# Patient Record
Sex: MALE | Race: BLACK OR AFRICAN AMERICAN | Employment: OTHER | URBAN - METROPOLITAN AREA
[De-identification: names, ages, dates, MRNs, and addresses within clinical notes are randomized per-mention and may not be internally consistent; named-entity substitution may affect disease eponyms.]

---

## 2018-01-11 ENCOUNTER — OFFICE VISIT (OUTPATIENT)
Dept: ORTHOPEDIC SURGERY | Facility: CLINIC | Age: 63
End: 2018-01-11

## 2018-01-11 VITALS
TEMPERATURE: 98.5 F | WEIGHT: 174.6 LBS | HEART RATE: 73 BPM | OXYGEN SATURATION: 96 % | HEIGHT: 68 IN | DIASTOLIC BLOOD PRESSURE: 79 MMHG | BODY MASS INDEX: 26.46 KG/M2 | RESPIRATION RATE: 18 BRPM | SYSTOLIC BLOOD PRESSURE: 141 MMHG

## 2018-01-11 DIAGNOSIS — M54.16 LUMBAR RADICULOPATHY: ICD-10-CM

## 2018-01-11 DIAGNOSIS — G89.29 CHRONIC PAIN OF RIGHT KNEE: ICD-10-CM

## 2018-01-11 DIAGNOSIS — E13.49 OTHER DIABETIC NEUROLOGICAL COMPLICATION ASSOCIATED WITH OTHER SPECIFIED DIABETES MELLITUS (HCC): ICD-10-CM

## 2018-01-11 DIAGNOSIS — S39.012A STRAIN OF LUMBAR REGION, INITIAL ENCOUNTER: Chronic | ICD-10-CM

## 2018-01-11 DIAGNOSIS — M51.36 DDD (DEGENERATIVE DISC DISEASE), LUMBAR: Primary | ICD-10-CM

## 2018-01-11 DIAGNOSIS — S16.1XXA STRAIN OF NECK MUSCLE, INITIAL ENCOUNTER: Chronic | ICD-10-CM

## 2018-01-11 DIAGNOSIS — M25.561 CHRONIC PAIN OF RIGHT KNEE: ICD-10-CM

## 2018-01-11 DIAGNOSIS — M25.571 RIGHT ANKLE PAIN, UNSPECIFIED CHRONICITY: ICD-10-CM

## 2018-01-11 DIAGNOSIS — M17.11 PRIMARY OSTEOARTHRITIS OF RIGHT KNEE: ICD-10-CM

## 2018-01-11 RX ORDER — METOPROLOL TARTRATE 50 MG/1
TABLET ORAL
Refills: 0 | COMMUNITY
Start: 2017-10-21

## 2018-01-11 RX ORDER — CYANOCOBALAMIN 1000 UG/ML
INJECTION, SOLUTION INTRAMUSCULAR; SUBCUTANEOUS
Refills: 0 | COMMUNITY
Start: 2017-12-31

## 2018-01-11 RX ORDER — FAMOTIDINE 40 MG/1
TABLET, FILM COATED ORAL
Refills: 0 | COMMUNITY
Start: 2017-12-11

## 2018-01-11 RX ORDER — BRIMONIDINE TARTRATE 1 MG/ML
SOLUTION/ DROPS OPHTHALMIC
Refills: 0 | COMMUNITY
Start: 2017-12-31

## 2018-01-11 RX ORDER — BUPIVACAINE HYDROCHLORIDE 2.5 MG/ML
4 INJECTION, SOLUTION EPIDURAL; INFILTRATION; INTRACAUDAL ONCE
Qty: 4 ML | Refills: 0
Start: 2018-01-11 | End: 2018-01-11

## 2018-01-11 RX ORDER — BETAMETHASONE SODIUM PHOSPHATE AND BETAMETHASONE ACETATE 3; 3 MG/ML; MG/ML
6 INJECTION, SUSPENSION INTRA-ARTICULAR; INTRALESIONAL; INTRAMUSCULAR; SOFT TISSUE ONCE
Qty: 0.5 ML | Refills: 0
Start: 2018-01-11 | End: 2018-01-11

## 2018-01-11 RX ORDER — ATORVASTATIN CALCIUM 10 MG/1
TABLET, FILM COATED ORAL
Refills: 0 | COMMUNITY
Start: 2017-12-31 | End: 2020-09-23

## 2018-01-11 RX ORDER — LORATADINE 10 MG/1
TABLET ORAL
Refills: 0 | COMMUNITY
Start: 2017-12-31

## 2018-01-11 RX ORDER — LATANOPROST 50 UG/ML
SOLUTION/ DROPS OPHTHALMIC
Refills: 0 | COMMUNITY
Start: 2017-12-31

## 2018-01-11 RX ORDER — AMLODIPINE BESYLATE 5 MG/1
TABLET ORAL
Refills: 0 | COMMUNITY
Start: 2017-10-21

## 2018-01-11 RX ORDER — DORZOLAMIDE HYDROCHLORIDE AND TIMOLOL MALEATE 20; 5 MG/ML; MG/ML
SOLUTION/ DROPS OPHTHALMIC
Refills: 0 | COMMUNITY
Start: 2017-12-06

## 2018-01-11 RX ORDER — FLUTICASONE PROPIONATE 44 UG/1
AEROSOL, METERED RESPIRATORY (INHALATION)
Refills: 0 | COMMUNITY
Start: 2017-12-11

## 2018-01-11 RX ORDER — GABAPENTIN 100 MG/1
CAPSULE ORAL
Refills: 0 | COMMUNITY
Start: 2017-12-31

## 2018-01-11 RX ORDER — MIRTAZAPINE 15 MG/1
TABLET, FILM COATED ORAL
Refills: 0 | COMMUNITY
Start: 2017-10-21

## 2018-01-11 RX ORDER — GUAIFENESIN 100 MG/5ML
81 LIQUID (ML) ORAL DAILY
COMMUNITY

## 2018-01-11 RX ORDER — LOVASTATIN 40 MG/1
TABLET ORAL
Refills: 0 | COMMUNITY
Start: 2017-10-21 | End: 2020-09-23

## 2018-01-11 NOTE — PROGRESS NOTES
Chief Complaint   Patient presents with    Ankle Pain     Right    Knee Pain     Right    Hip Pain     Right

## 2018-01-11 NOTE — PATIENT INSTRUCTIONS
Learning About How to Have a Healthy Back  What causes back pain? Back pain is often caused by overuse, strain, or injury. For example, people often hurt their backs playing sports or working in the yard, being jolted in a car accident, or lifting something too heavy. Aging plays a part too. Your bones and muscles tend to lose strength as you age, which makes injury more likely. The spongy discs between the bones of the spine (vertebrae) may suffer from wear and tear and no longer provide enough cushion between the bones. A disc that bulges or breaks open (herniated disc) can press on nerves, causing back pain. In some people, back pain is the result of arthritis, broken vertebrae caused by bone loss (osteoporosis), illness, or a spine problem. Although most people have back pain at one time or another, there are steps you can take to make it less likely. How can you have a healthy back? Reduce stress on your back through good posture  Slumping or slouching alone may not cause low back pain. But after the back has been strained or injured, bad posture can make pain worse. · Sleep in a position that maintains your back's normal curves and on a mattress that feels comfortable. Sleep on your side with a pillow between your knees, or sleep on your back with a pillow under your knees. These positions can reduce strain on your back. · Stand and sit up straight. \"Good posture\" generally means your ears, shoulders, and hips are in a straight line. · If you must stand for a long time, put one foot on a stool, ledge, or box. Switch feet every now and then. · Sit in a chair that is low enough to let you place both feet flat on the floor with both knees nearly level with your hips. If your chair or desk is too high, use a footrest to raise your knees. Place a small pillow, a rolled-up towel, or a lumbar roll in the curve of your back if you need extra support.   · Try a kneeling chair, which helps tilt your hips forward. This takes pressure off your lower back. · Try sitting on an exercise ball. It can rock from side to side, which helps keep your back loose. · When driving, keep your knees nearly level with your hips. Sit straight, and drive with both hands on the steering wheel. Your arms should be in a slightly bent position. Reduce stress on your back through careful lifting  · Squat down, bending at the hips and knees only. If you need to, put one knee to the floor and extend your other knee in front of you, bent at a right angle (half kneeling). · Press your chest straight forward. This helps keep your upper back straight while keeping a slight arch in your low back. · Hold the load as close to your body as possible, at the level of your belly button (navel). · Use your feet to change direction, taking small steps. · Lead with your hips as you change direction. Keep your shoulders in line with your hips as you move. · Set down your load carefully, squatting with your knees and hips only. Exercise and stretch your back  · Do some exercise on most days of the week, if your doctor says it is okay. You can walk, run, swim, or cycle. · Stretch your back muscles. Here are a few exercises to try:  Nakul Foil on your back, and gently pull one bent knee to your chest. Put that foot back on the floor, and then pull the other knee to your chest.  ¨ Do pelvic tilts. Lie on your back with your knees bent. Tighten your stomach muscles. Pull your belly button (navel) in and up toward your ribs. You should feel like your back is pressing to the floor and your hips and pelvis are slightly lifting off the floor. Hold for 6 seconds while breathing smoothly. ¨ Sit with your back flat against a wall. · Keep your core muscles strong. The muscles of your back, belly (abdomen), and buttocks support your spine. ¨ Pull in your belly and imagine pulling your navel toward your spine. Hold this for 6 seconds, then relax.  Remember to keep breathing normally as you tense your muscles. ¨ Do curl-ups. Always do them with your knees bent. Keep your low back on the floor, and curl your shoulders toward your knees using a smooth, slow motion. Keep your arms folded across your chest. If this bothers your neck, try putting your hands behind your neck (not your head), with your elbows spread apart. ¨ Lie on your back with your knees bent and your feet flat on the floor. Tighten your belly muscles, and then push with your feet and raise your buttocks up a few inches. Hold this position 6 seconds as you continue to breathe normally, then lower yourself slowly to the floor. Repeat 8 to 12 times. ¨ If you like group exercise, try Pilates or yoga. These classes have poses that strengthen the core muscles. Lead a healthy lifestyle  · Stay at a healthy weight to avoid strain on your back. · Do not smoke. Smoking increases the risk of osteoporosis, which weakens the spine. If you need help quitting, talk to your doctor about stop-smoking programs and medicines. These can increase your chances of quitting for good. Where can you learn more? Go to http://radhaSellanAppla.info/. Enter L315 in the search box to learn more about \"Learning About How to Have a Healthy Back. \"  Current as of: March 21, 2017  Content Version: 11.4  © 7927-5328 Healthwise, Incorporated. Care instructions adapted under license by [employer] (which disclaims liability or warranty for this information). If you have questions about a medical condition or this instruction, always ask your healthcare professional. [place] [address] any warranty or liability for your use of this information. Neck: Exercises  Your Care Instructions  Here are some examples of typical rehabilitation exercises for your condition. Start each exercise slowly. Ease off the exercise if you start to have pain.   Your doctor or physical therapist will tell you when you can start these exercises and which ones will work best for you. How to do the exercises  Neck stretch    1. This stretch works best if you keep your shoulder down as you lean away from it. To help you remember to do this, start by relaxing your shoulders and lightly holding on to your thighs or your chair. 2. Tilt your head toward your shoulder and hold for 15 to 30 seconds. Let the weight of your head stretch your muscles. 3. If you would like a little added stretch, use your hand to gently and steadily pull your head toward your shoulder. For example, keeping your right shoulder down, lean your head to the left. 4. Repeat 2 to 4 times toward each shoulder. Diagonal neck stretch    1. Turn your head slightly toward the direction you will be stretching, and tilt your head diagonally toward your chest and hold for 15 to 30 seconds. 2. If you would like a little added stretch, use your hand to gently and steadily pull your head forward on the diagonal.  3. Repeat 2 to 4 times toward each side. Dorsal glide stretch    The dorsal glide stretches the back of the neck. If you feel pain, do not glide so far back. Some people find this exercise easier to do while lying on their backs with an ice pack on the neck. 1. Sit or stand tall and look straight ahead. 2. Slowly tuck your chin as you glide your head backward over your body  3. Hold for a count of 6, and then relax for up to 10 seconds. 4. Repeat 8 to 12 times. Chest and shoulder stretch    1. Sit or stand tall and glide your head backward as in the dorsal glide stretch. 2. Raise both arms so that your hands are next to your ears. 3. Take a deep breath, and as you breathe out, lower your elbows down and behind your back. You will feel your shoulder blades slide down and together, and at the same time you will feel a stretch across your chest and the front of your shoulders. 4. Hold for about 6 seconds, and then relax for up to 10 seconds.   5. Repeat 8 to 12 times. Strengthening: Hands on head    1. Move your head backward, forward, and side to side against gentle pressure from your hands, holding each position for about 6 seconds. 2. Repeat 8 to 12 times. Follow-up care is a key part of your treatment and safety. Be sure to make and go to all appointments, and call your doctor if you are having problems. It's also a good idea to know your test results and keep a list of the medicines you take. Where can you learn more? Go to http://radha-la.info/. Enter P975 in the search box to learn more about \"Neck: Exercises. \"  Current as of: March 21, 2017  Content Version: 11.4  © 7958-6626 Healthwise, Incorporated. Care instructions adapted under license by [employer] (which disclaims liability or warranty for this information). If you have questions about a medical condition or this instruction, always ask your healthcare professional. [address] any warranty or liability for your use of this information.
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
## 2018-01-11 NOTE — MR AVS SNAPSHOT
Visit Information Date & Time Provider Department Dept. Phone Encounter #  
 1/11/2018 10:00 AM Hien Richards, 27 [facility] [place] [address] [phone] [id number] Follow-up Instructions Return if symptoms worsen or fail to improve. Upcoming Health Maintenance Date Due Hepatitis C Screening 1955 DTaP/Tdap/Td series (1 - Tdap) 3/31/1977 FOBT Q 1 YEAR AGE 50-75 3/31/2006 ZOSTER VACCINE AGE 60> 1/31/2016 Influenza Age 5 to Adult 8/1/2017 Allergies as of 1/11/2018  Review Complete On: 1/11/2018 By: Monroe Merlin No Known Allergies Current Immunizations  Never Reviewed No immunizations on file. Not reviewed this visit You Were Diagnosed With   
  
 Codes Comments DDD (degenerative disc disease), lumbar    -  Primary ICD-10-CM: M51.36 
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 
ICD-9-CM: 249.60 Chronic pain of right knee     ICD-10-CM: M25.561, G89.29 ICD-9-CM: 719.46, 338.29 Lumbar radiculopathy     ICD-10-CM: M54.16 
ICD-9-CM: 724.4 Strain of neck muscle, initial encounter     ICD-10-CM: S16. Sara Reil ICD-9-CM: 847.0 Strain of lumbar region, initial encounter     ICD-10-CM: S39.012A ICD-9-CM: 348. 2 Vitals BP Pulse Temp Resp Height(growth percentile) Weight(growth percentile) 141/79 73 98.5 °F (36.9 °C) (Oral) 18 5' 8\" (1.727 m) 174 lb 9.6 oz (79.2 kg) SpO2 BMI Smoking Status 96% 26.55 kg/m2 Current Some Day Smoker BMI and BSA Data Body Mass Index Body Surface Area  
 26.55 kg/m 2 1.95 m 2 Your Updated Medication List  
  
   
 This list is accurate as of: 1/11/18 10:52 AM.  Always use your most recent med list.  
  
  
  
  
 ALPHAGAN P 0.1 % ophthalmic solution Generic drug:  brimonidine  
  
 amLODIPine 5 mg tablet Commonly known as:  NORVASC  
  
 aspirin 81 mg chewable tablet Take 81 mg by mouth daily. atorvastatin 10 mg tablet Commonly known as:  LIPITOR  
  
 cyanocobalamin 1,000 mcg/mL injection Commonly known as:  VITAMIN B12  
  
 dorzolamide-timolol 22.3-6.8 mg/mL ophthalmic solution Commonly known as:  COSOPT  
instill 1 drop into both eyes once daily  
  
 famotidine 40 mg tablet Commonly known as:  PEPCID  
take 1 tablet by mouth at bedtime FLOVENT HFA 44 mcg/actuation inhaler Generic drug:  fluticasone  
inhale 2 puffs by mouth twice a day  
  
 gabapentin 100 mg capsule Commonly known as:  NEURONTIN  
  
 latanoprost 0.005 % ophthalmic solution Commonly known as:  XALATAN  
  
 loratadine 10 mg tablet Commonly known as:  CLARITIN  
  
 lovastatin 40 mg tablet Commonly known as:  MEVACOR  
  
 metoprolol tartrate 50 mg tablet Commonly known as:  LOPRESSOR  
  
 mirtazapine 15 mg tablet Commonly known as:  [place] We Performed the Following AMB POC XRAY, ANKLE; COMPLETE, 3+ VIE [13264 CPT(R)] Follow-up Instructions Return if symptoms worsen or fail to improve. Patient Instructions Learning About How to Have a Healthy Back What causes back pain? Back pain is often caused by overuse, strain, or injury. For example, people often hurt their backs playing sports or working in the yard, being jolted in a car accident, or lifting something too heavy. Aging plays a part too. Your bones and muscles tend to lose strength as you age, which makes injury more likely. The spongy discs between the bones of the spine (vertebrae) may suffer from wear and tear and no longer provide enough cushion between the bones.  A disc that bulges or breaks open (herniated disc) can press on nerves, causing back pain. In some people, back pain is the result of arthritis, broken vertebrae caused by bone loss (osteoporosis), illness, or a spine problem. Although most people have back pain at one time or another, there are steps you can take to make it less likely. How can you have a healthy back? Reduce stress on your back through good posture Slumping or slouching alone may not cause low back pain. But after the back has been strained or injured, bad posture can make pain worse. · Sleep in a position that maintains your back's normal curves and on a mattress that feels comfortable. Sleep on your side with a pillow between your knees, or sleep on your back with a pillow under your knees. These positions can reduce strain on your back. · Stand and sit up straight. \"Good posture\" generally means your ears, shoulders, and hips are in a straight line. · If you must stand for a long time, put one foot on a stool, ledge, or box. Switch feet every now and then. · Sit in a chair that is low enough to let you place both feet flat on the floor with both knees nearly level with your hips. If your chair or desk is too high, use a footrest to raise your knees. Place a small pillow, a rolled-up towel, or a lumbar roll in the curve of your back if you need extra support. · Try a kneeling chair, which helps tilt your hips forward. This takes pressure off your lower back. · Try sitting on an exercise ball. It can rock from side to side, which helps keep your back loose. · When driving, keep your knees nearly level with your hips. Sit straight, and drive with both hands on the steering wheel. Your arms should be in a slightly bent position. Reduce stress on your back through careful lifting · Squat down, bending at the hips and knees only. If you need to, put one knee to the floor and extend your other knee in front of you, bent at a right angle (half kneeling). · Press your chest straight forward. This helps keep your upper back straight while keeping a slight arch in your low back. · Hold the load as close to your body as possible, at the level of your belly button (navel). · Use your feet to change direction, taking small steps. · Lead with your hips as you change direction. Keep your shoulders in line with your hips as you move. · Set down your load carefully, squatting with your knees and hips only. Exercise and stretch your back · Do some exercise on most days of the week, if your doctor says it is okay. You can walk, run, swim, or cycle. · Stretch your back muscles. Here are a few exercises to try: ¨ Lie on your back, and gently pull one bent knee to your chest. Put that foot back on the floor, and then pull the other knee to your chest. 
¨ Do pelvic tilts. Lie on your back with your knees bent. Tighten your stomach muscles. Pull your belly button (navel) in and up toward your ribs. You should feel like your back is pressing to the floor and your hips and pelvis are slightly lifting off the floor. Hold for 6 seconds while breathing smoothly. ¨ Sit with your back flat against a wall. · Keep your core muscles strong. The muscles of your back, belly (abdomen), and buttocks support your spine. ¨ Pull in your belly and imagine pulling your navel toward your spine. Hold this for 6 seconds, then relax. Remember to keep breathing normally as you tense your muscles. ¨ Do curl-ups. Always do them with your knees bent. Keep your low back on the floor, and curl your shoulders toward your knees using a smooth, slow motion. Keep your arms folded across your chest. If this bothers your neck, try putting your hands behind your neck (not your head), with your elbows spread apart. ¨ Lie on your back with your knees bent and your feet flat on the floor.  Tighten your belly muscles, and then push with your feet and raise your buttocks up a few inches. Hold this position 6 seconds as you continue to breathe normally, then lower yourself slowly to the floor. Repeat 8 to 12 times. ¨ If you like group exercise, try Pilates or yoga. These classes have poses that strengthen the core muscles. Lead a healthy lifestyle · Stay at a healthy weight to avoid strain on your back. · Do not smoke. Smoking increases the risk of osteoporosis, which weakens the spine. If you need help quitting, talk to your doctor about stop-smoking programs and medicines. These can increase your chances of quitting for good. Where can you learn more? Go to http://radhaDatria Systemsla.info/. Enter L315 in the search box to learn more about \"Learning About How to Have a Healthy Back. \" Current as of: March 21, 2017 Content Version: 11.4 © 7088-9101 [employer]. Care instructions adapted under license by [employer] (which disclaims liability or warranty for this information). If you have questions about a medical condition or this instruction, always ask your healthcare professional. [place] [address] any warranty or liability for your use of this information. Neck: Exercises Your Care Instructions Here are some examples of typical rehabilitation exercises for your condition. Start each exercise slowly. Ease off the exercise if you start to have pain. Your doctor or physical therapist will tell you when you can start these exercises and which ones will work best for you. How to do the exercises Neck stretch 1. This stretch works best if you keep your shoulder down as you lean away from it. To help you remember to do this, start by relaxing your shoulders and lightly holding on to your thighs or your chair. 2. Tilt your head toward your shoulder and hold for 15 to 30 seconds. Let the weight of your head stretch your muscles.  
3. If you would like a little added stretch, use your hand to gently and steadily pull your head toward your shoulder. For example, keeping your right shoulder down, lean your head to the left. 4. Repeat 2 to 4 times toward each shoulder. Diagonal neck stretch 1. Turn your head slightly toward the direction you will be stretching, and tilt your head diagonally toward your chest and hold for 15 to 30 seconds. 2. If you would like a little added stretch, use your hand to gently and steadily pull your head forward on the diagonal. 
3. Repeat 2 to 4 times toward each side. Dorsal glide stretch The dorsal glide stretches the back of the neck. If you feel pain, do not glide so far back. Some people find this exercise easier to do while lying on their backs with an ice pack on the neck. 1. Sit or stand tall and look straight ahead. 2. Slowly tuck your chin as you glide your head backward over your body 3. Hold for a count of 6, and then relax for up to 10 seconds. 4. Repeat 8 to 12 times. Chest and shoulder stretch 1. Sit or stand tall and glide your head backward as in the dorsal glide stretch. 2. Raise both arms so that your hands are next to your ears. 3. Take a deep breath, and as you breathe out, lower your elbows down and behind your back. You will feel your shoulder blades slide down and together, and at the same time you will feel a stretch across your chest and the front of your shoulders. 4. Hold for about 6 seconds, and then relax for up to 10 seconds. 5. Repeat 8 to 12 times. Strengthening: Hands on head 1. Move your head backward, forward, and side to side against gentle pressure from your hands, holding each position for about 6 seconds. 2. Repeat 8 to 12 times. Follow-up care is a key part of your treatment and safety. Be sure to make and go to all appointments, and call your doctor if you are having problems. It's also a good idea to know your test results and keep a list of the medicines you take. Where can you learn more? Go to http://radha-la.info/. Enter P975 in the search box to learn more about \"Neck: Exercises. \" Current as of: March 21, 2017 Content Version: 11.4 © 8795-8818 [employer]. Care instructions adapted under license by [employer] (which disclaims liability or warranty for this information). If you have questions about a medical condition or this instruction, always ask your healthcare professional. [address] any warranty or liability for your use of this information. Introducing [facility]! [employer] introduces [employer] patient portal. Now you can access parts of your medical record, email your doctor's office, and request medication refills online. 1. In your internet browser, go to [email or web address] 2. Click on the First Time User? Click Here link in the Sign In box. You will see the New Member Sign Up page. 3. Enter your ProteoSense Access Code exactly as it appears below. You will not need to use this code after youve completed the sign-up process. If you do not sign up before the expiration date, you must request a new code. · Prote[employer]ense Access Code: [email or web address] Expires: 3/7/2018 11:37 AM 
 
4. Enter the last four digits of your Social Security Number (xxxx) and Date of Birth (mm/dd/yyyy) as indicated and click Submit. You will be taken to the next sign-up page. 5. Create a ProteoSense ID. This will be your ProteoSense login ID and cannot be changed, so think of one that is secure and easy to remember. 6. Create a [employer]ense password. You can change your password at any time. 7. Enter your Password Reset Question and Answer. This can be used at a later time if you forget your password. 8. Enter your e-mail address. You will receive e-mail notification when new information is available in [address] 9. Click Sign Up. You can now view and download portions of your medical record. 10. Click the Download Summary menu link to download a portable copy of your medical information. If you have questions, please visit the Frequently Asked Questions section of the [employer] website. Remember, [employer] is NOT to be used for urgent needs. For medical emergencies, dial 911. Now available from your iPhone and Android! Please provide this summary of care documentation to your next provider. Your primary care clinician is listed as Severino Muir. If you have any questions after today's visit, please call [phone].

## 2018-01-16 ENCOUNTER — HOSPITAL ENCOUNTER (OUTPATIENT)
Dept: PHYSICAL THERAPY | Age: 63
Discharge: HOME | End: 2018-01-16
Payer: MEDICARE

## 2018-01-16 PROCEDURE — 97162 PT EVAL MOD COMPLEX 30 MIN: CPT

## 2018-01-16 PROCEDURE — G8979 MOBILITY GOAL STATUS: HCPCS

## 2018-01-16 PROCEDURE — G8978 MOBILITY CURRENT STATUS: HCPCS

## 2018-01-16 PROCEDURE — 97110 THERAPEUTIC EXERCISES: CPT

## 2018-01-16 NOTE — PROGRESS NOTES
PT DAILY TREATMENT NOTE    Patient Name: Blayne Quezada  Date:2018  : 1955  [x]  Patient  Verified  Payor: /    In time:1:25 Out time:2:05  Total Treatment Time (min): 40  Total Timed Codes (min): 40  1:1 Treatment Time ( [address] only): 40   Visit #: 1 of 12    Physical Therapy Evaluation - Lumbar Spine     See POC     General Health:  Red Flags Indicated? [] Yes    [] No  List:  Past History/Treatments:     Diagnostic Tests: [] Lab work [] X-rays    [] CT [] MRI     [] Other:  Results:    Functional Status  Prior level of function:  Present functional limitations:    OBJECTIVE  Posture:    Gait:  [] Normal     [] Abnormal:    Active Movements: [] N/A   [] Too acute   [] Other:  ROM % AROM % PROM Comments:pain, area   Forward flexion       Extension       SB right       SB left       Rotation right       Rotation left         Repeated Movement Testing:    Neuro Screen [] WNL  Myotome/Dermatome/Reflexes:  Comments:    Dural Mobility:  SLR Sitting: [] R    [] L    [] +    [] -  @ (degrees):           Supine: [] R    [] L    [] +    [] -  @ (degrees):   Slump Test: [] R    [] L    [] +    [] -  @ (degrees):   Prone Knee Bend: [] R    [] L    [] +    [] -     Palpation  [] Min  [] Mod  [] Severe    Location:  [] Min  [] Mod  [] Severe    Location:  [] Min  [] Mod  [] Severe    Location:      Strength   L(0-5) R (0-5) N/T   Hip Flexion (L1,2)   []   Knee Extension (L3,4)   []   Ankle Dorsiflexion (L4)   []   Great Toe Extension (L5)   []   Ankle Plantarflexion (S1)   []   Knee Flexion (S1,2)   []   Upper Abdominals   []   Lower Abdominals   []   Paraspinals   []   Back Rotators   []   Gluteus Gatito   []   Other   []     Special Tests  Lumbar:  Lumb.  Compression: [] Pos  [] Neg               Lumbar Distraction:   [] Pos  [] Neg    Quadrant:  [] Pos  [] Neg   [] Flex  [] Ext    Sacroilliac:  Gaenslen's: [] R    [] L    [] +    [] -     Compression: [] +    [] -     Gapping:  [] +    [] -     Thigh Thrust: [] R [] L    [] +    [] -     Leg Length: [] +    [] -   Position:           Hip: Curlie Kodak:  [] R    [] L    [] +    [] -     Scour:  [] R    [] L    [] +    [] -     Piriformis: [] R    [] L    [] +    [] -          Deficits: Mei's: [] R    [] L    [] +    [] -     Rondi Heal: [] R    [] L    [] +    [] -     Hamstrings 90/90:    Gastrocsoleus (to neutral): Right: Left:    Other tests/comments:  OBJECTIVE  Modality (rationale):   []  E-Stim: type _ x _ min     []att   []unatt   []w/US   []w/ice   []w/heat  []  Traction: []cerv   []pelvic   _ lbs x _ min     []pro   []sup   []int   []const  []  Ultrasound: []cont   []pulse    _ W/cm2 x _  min   []1MHz   []3MHz  []  Iontophoresis: []take home patch w/ dexamethazone    []40mA   []80mA                               []_ mA min w/: []dexamethazone   []other:_  []  Ice pack _  min     [] Hot pack _  min     [] Paraffin _  min  []  Other:     Therapeutic Exercise: [x] see flow sheet     [] Other:_  Added/Changed Exercises:  [x]  Added:_See HEP  to improve (function): Hip mobility and strength, RTC/scap stabilization  []  Changed:_ to improve (function):  (minutes:10 )    Other Objective/Functional Measures:   Pain Level (0-10 scale) post treatment: 8    ASSESSMENT  [x]  See Plan of Care  Patient is assigned a medium evaluation complexity based upon presence of >3 medical comorbidities, FOTO score, and changing, widespread and extensive symptom characteristics.     PLAN  See Plan of [address], [place] 1/16/2018  1:07 PM
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
## 2018-01-19 ENCOUNTER — HOSPITAL ENCOUNTER (OUTPATIENT)
Dept: PHYSICAL THERAPY | Age: 63
End: 2018-01-19
Payer: MEDICARE

## 2018-01-23 ENCOUNTER — HOSPITAL ENCOUNTER (OUTPATIENT)
Dept: PHYSICAL THERAPY | Age: 63
Discharge: HOME | End: 2018-01-23
Payer: MEDICARE

## 2018-01-23 PROCEDURE — 97110 THERAPEUTIC EXERCISES: CPT

## 2018-01-23 NOTE — PROGRESS NOTES
PT DAILY TREATMENT NOTE 8    Patient Name: Rui Jaramillo  Date:2018  : 1955  [x]  Patient  Verified  Payor: Jairon Billingsley / Plan: [place] / Product Type: Medicare /    In time::  Out time:2:22  Total Treatment Time (min): 51  Total Timed Codes (min): 43  1:1 Treatment Time (min): 43   Visit #: 2 of 12    Treatment Area: Strain of muscle, fascia and tendon of lower back, initial encounter [O97.501T]  Strain of muscle, fascia and tendon at neck level, initial encounter [S16. 1XXA]  Radiculopathy, lumbar region [M54.16]  Pain in right knee [M25.561]    SUBJECTIVE  Pain Level (0-10 scale): 810  Any medication changes, allergies to medications, adverse drug reactions, diagnosis change, or new procedure performed?: [x] No    [] Yes (see summary sheet for update)  Subjective functional status/changes:   [] No changes reported  I feel most of the pain in the right side of my neck and in my right hip and it locks up and spasms in my groin area down to the inside of my knee when I cross my leg over the other one sometimes. OBJECTIVE      51 min Therapeutic Exercise:  [x] See flow sheet :   Rationale: increase ROM and increase strength to improve the patients ability to improve functional abilities           min Patient Education: [x] Review HEP    [] Progressed/Changed HEP based on:   [] positioning   [] body mechanics   [] transfers   [] heat/ice application        Other Objective/Functional Measures:     Pain Level (0-10 scale) post treatment: 0    ASSESSMENT/Changes in Function: Pt tolerated all new therex fairly well upon trial today with chief c/o R cervical and R hip pain/sxs especially with radicular R adductor pain/spasms with crossing his legs. Pt needs most focus on cervical,lumbar and LE flexibility and postural strengthening with addressing multiple regions of pain/symptoms appropriately. Will continue to progress/advance patient within current POC as tolerated with monitoring symptoms. Patient will continue to benefit from skilled PT services to modify and progress therapeutic interventions, address functional mobility deficits, address ROM deficits, address strength deficits, analyze and cue movement patterns, analyze and modify body mechanics/ergonomics and assess and modify postural abnormalities to attain remaining goals.      []  See Plan of Care  []  See progress note/recertification  []  See Discharge Summary         Progress towards goals / Updated goals:      PLAN  [x]  Upgrade activities as tolerated     []  Continue plan of care  []  Update interventions per flow sheet       []  Discharge due to:_  []  Other:_      Kateryna Stokes, PTA 1/23/2018  1:56 PM

## 2018-01-25 ENCOUNTER — HOSPITAL ENCOUNTER (OUTPATIENT)
Dept: PHYSICAL THERAPY | Age: 63
Discharge: HOME | End: 2018-01-25
Payer: MEDICARE

## 2018-01-25 PROCEDURE — 97110 THERAPEUTIC EXERCISES: CPT

## 2018-01-25 NOTE — PROGRESS NOTES
PT DAILY TREATMENT NOTE 8    Patient Name: Du Hernandez  Date:2018  : 1955  [x]  Patient  Verified  Payor: Kyle Serrato / Plan: [place] / Product Type: Medicare /    In time:2:00  Out time:2:51  Total Treatment Time (min): 51  Total Timed Codes (min): 51  1:1 Treatment Time (min): 30   Visit #: 3 of 12    Treatment Area: Strain of muscle, fascia and tendon of lower back, initial encounter [Q37.494A]  Strain of muscle, fascia and tendon at neck level, initial encounter [S16. 1XXA]  Radiculopathy, lumbar region [M54.16]  Pain in right knee [M25.561]    SUBJECTIVE  Pain Level (0-10 scale): 7  Any medication changes, allergies to medications, adverse drug reactions, diagnosis change, or new procedure performed?: [x] No    [] Yes (see summary sheet for update)  Subjective functional status/changes:   [] No changes reported  Pt reports his R arm and leg are aching today, but were no worse after completing therapy 2 days ago. OBJECTIVE    51 min Therapeutic Exercise:  [] See flow sheet :   Rationale: increase ROM and increase strength to improve the patients ability to change and maintain body/trunk positions     min Therapeutic Activity:  []  See flow sheet :   Rationale:      Pain Level (0-10 scale) post treatment: 2    ASSESSMENT/Changes in Function: Pt requires moderate motor planning cues with performance of several exercises, but otherwise is able to do all prescribed activities with consistently mild/mod fatigue on the right side >left. Patient will continue to benefit from skilled PT services to address functional mobility deficits, address strength deficits and analyze and cue movement patterns to attain remaining goals.      []  See Plan of Care  []  See progress note/recertification  []  See Discharge Summary           PLAN  []  Upgrade activities as tolerated     [x]  Continue plan of care  []  Update interventions per flow sheet       []  Discharge due to:_  []  Other:_ Efrain Brady [place] 1/25/2018  11:13 AM

## 2018-01-31 ENCOUNTER — HOSPITAL ENCOUNTER (OUTPATIENT)
Dept: PHYSICAL THERAPY | Age: 63
Discharge: HOME | End: 2018-01-31
Payer: MEDICARE

## 2018-01-31 PROCEDURE — 97110 THERAPEUTIC EXERCISES: CPT

## 2018-01-31 NOTE — PROGRESS NOTES
PT DAILY TREATMENT NOTE 8    Patient Name: Licha Lomax  Date:2018  : 1955  [x]  Patient  Verified  Payor: Drake Human / Plan: [place] / Product Type: Medicare /    In time:1:02  Out time:2:07  Total Treatment Time (min): 65  Total Timed Codes (min): 58  1:1 Treatment Time (min): 30   Visit #: 4 of 12    Treatment Area: Strain of muscle, fascia and tendon of lower back, initial encounter [M68.456Q]  Strain of muscle, fascia and tendon at neck level, initial encounter [S16. 1XXA]  Radiculopathy, lumbar region [M54.16]  Pain in right knee [M25.561]    SUBJECTIVE  Pain Level (0-10 scale): 7/10  Any medication changes, allergies to medications, adverse drug reactions, diagnosis change, or new procedure performed?: [x] No    [] Yes (see summary sheet for update)  Subjective functional status/changes:   [] No changes reported  I feel like the therapy is helping because I feel like I can move my neck and back better due to feeling more cracking and popping. OBJECTIVE    65 min Therapeutic Exercise:  [x] See flow sheet :   Rationale: increase ROM, increase strength, improve balance and increase proprioception to improve the patients ability to improve functional abilities           min Patient Education: [x] Review HEP    [] Progressed/Changed HEP based on:   [] positioning   [] body mechanics   [] transfers   [] heat/ice application        Other Objective/Functional Measures:     Pain Level (0-10 scale) post treatment: 0    ASSESSMENT/Changes in Function: Pt presented with the most functional improvement with increased cervical and lumbar mobility with ADL's over the past 2 visits. Pt was also increased to addition of prolonged static postural stretching to address palpable tension in anterior compartment/pectorals today. Will continue to progress/advance patient within current POC as tolerated with monitoring symptoms.      Patient will continue to benefit from skilled PT services to modify and progress therapeutic interventions, address functional mobility deficits, address ROM deficits, address strength deficits, analyze and cue movement patterns, analyze and modify body mechanics/ergonomics and assess and modify postural abnormalities  to attain remaining goals.      []  See Plan of Care  []  See progress note/recertification  []  See Discharge Summary         Progress towards goals / Updated goals:      PLAN  [x]  Upgrade activities as tolerated     []  Continue plan of care  []  Update interventions per flow sheet       []  Discharge due to:_  []  Other:_      Shanna Boo PTA 1/31/2018  1:02 PM

## 2018-02-02 ENCOUNTER — HOSPITAL ENCOUNTER (OUTPATIENT)
Dept: PHYSICAL THERAPY | Age: 63
Discharge: HOME | End: 2018-02-02
Payer: MEDICARE

## 2018-02-02 PROCEDURE — 97110 THERAPEUTIC EXERCISES: CPT

## 2018-02-02 NOTE — PROGRESS NOTES
PT DAILY TREATMENT NOTE 8-14    Patient Name: Du Hernandez  Date:2018  : 1955  [x]  Patient  Verified  Payor: Kyle Serrato / Plan: [place] MED[place]ARE PART A [place] B / Product Type: Medicare /    In time:1:02  Out time:2:11  Total Treatment Time (min): 69  Total Timed Codes (min): 62  1:1 Treatment Time (min): 30   Visit #: 5 of 12    Treatment Area: Strain of muscle, fascia and tendon of lower back, initial encounter [Z40.108W]  Strain of muscle, fascia and tendon at neck level, initial encounter [S16. 1XXA]  Radiculopathy, lumbar region [M54.16]  Pain in right knee [M25.561]    SUBJECTIVE  Pain Level (0-10 scale): 6-7/10  Any medication changes, allergies to medications, adverse drug reactions, diagnosis change, or new procedure performed?: [x] No    [] Yes (see summary sheet for update)  Subjective functional status/changes:   [] No changes reported  My neck and both of my shoulders are bothering me the most, but my lower back and hips are actually feeling pretty good today. OBJECTIVE    69 min Therapeutic Exercise:  [x] See flow sheet :   Rationale: increase ROM and increase strength to improve the patients ability to improve abilities           min Patient Education: [x] Review HEP    [] Progressed/Changed HEP based on:   [] positioning   [] body mechanics   [] transfers   [] heat/ice application        Other Objective/Functional Measures:     Pain Level (0-10 scale) post treatment: 0    ASSESSMENT/Changes in Function: Pt presented with chief c/o cervical and bilateral shoulder pain/sxs, but minimal lumbar/hip pain/sxs since last tx. Pt was able to advance to addition of modified sharpened romberg stance on foam with min to mod challenge with proprioceptive/balance awareness today. Will continue to progress/advance patient within current POC as tolerated with monitoring symptoms.     Patient will continue to benefit from skilled PT services to modify and progress therapeutic interventions, address functional mobility deficits, address ROM deficits, address strength deficits, analyze and cue movement patterns, analyze and modify body mechanics/ergonomics and assess and modify postural abnormalities to attain remaining goals.      []  See Plan of Care  []  See progress note/recertification  []  See Discharge Summary         Progress towards goals / Updated goals:      PLAN  [x]  Upgrade activities as tolerated     []  Continue plan of care  []  Update interventions per flow sheet       []  Discharge due to:_  []  Other:_      Kylah Ortega, KATIE 2/2/2018  1:01 PM

## 2018-02-06 ENCOUNTER — HOSPITAL ENCOUNTER (OUTPATIENT)
Dept: PHYSICAL THERAPY | Age: 63
Discharge: HOME | End: 2018-02-06
Payer: MEDICARE

## 2018-02-06 PROCEDURE — 97110 THERAPEUTIC EXERCISES: CPT

## 2018-02-06 NOTE — PROGRESS NOTES
PT DAILY TREATMENT NOTE     Patient Name: Akila Valadez  Date:2018  : 1955  [x]  Patient  Verified  Payor: Baljeet Ayoub / Plan: [place] MEDICARE PART A & B / Product Type: Medicare /    In time:1:01  Out time:2:07  Total Treatment Time (min): 66  Total Timed Codes (min): 59  1:1 Treatment Time (min): 30   Visit #: 6 of 12    Treatment Area: Strain of muscle, fascia and tendon of lower back, initial encounter [V68.048W]  Strain of muscle, fascia and tendon at neck level, initial encounter [S16. 1XXA]  Radiculopathy, lumbar region [M54.16]  Pain in right knee [M25.561]    SUBJECTIVE  Pain Level (0-10 scale): 7/10 R hamstring region  Any medication changes, allergies to medications, adverse drug reactions, diagnosis change, or new procedure performed?: [x] No    [] Yes (see summary sheet for update)  Subjective functional status/changes:   [] No changes reported  My neck and shoulders feel pretty good, but I have been feeling most of the pain in my right hamstring region since I was here last. I did feel a shooting pain down my right arm while I was waiting out in the waiting room today. OBJECTIVE      66 min Therapeutic Exercise:  [] See flow sheet :   Rationale: increase ROM and increase strength to improve the patients ability to improve functional abilities           min Patient Education: [x] Review HEP    [] Progressed/Changed HEP based on:   [] positioning   [] body mechanics   [] transfers   [] heat/ice application        Other Objective/Functional Measures:     Pain Level (0-10 scale) post treatment: 0    ASSESSMENT/Changes in Function: Patient reports approximately 65% overall improvement from therapy since initial evaluation with chief c/o R hamstring region pain/sxs and intermittent R UE radicular shooting pain today. Pt was able to increase reps with step ups today with min to mod challenge with LE strength/edurance as well. Will continue to progress/advance patient within current POC as tolerated with monitoring symptoms. Patient will continue to benefit from skilled PT services to modify and progress therapeutic interventions, address functional mobility deficits, address ROM deficits, address strength deficits, analyze and cue movement patterns, analyze and modify body mechanics/ergonomics and assess and modify postural abnormalities to attain remaining goals.      []  See Plan of Care  []  See progress note/recertification  []  See Discharge Summary         Progress towards goals / Updated goals:      PLAN  [x]  Upgrade activities as tolerated     []  Continue plan of care  []  Update interventions per flow sheet       []  Discharge due to:_  []  Other:_      Deetta Homans, KATIE 2/6/2018  1:00 PM

## 2018-02-09 ENCOUNTER — HOSPITAL ENCOUNTER (OUTPATIENT)
Dept: PHYSICAL THERAPY | Age: 63
Discharge: HOME | End: 2018-02-09
Payer: MEDICARE

## 2018-02-09 PROCEDURE — 97110 THERAPEUTIC EXERCISES: CPT

## 2018-02-09 NOTE — PROGRESS NOTES
PT DAILY TREATMENT NOTE     Patient Name: Shelby Yeboah  Date:2018  : 1955  [x]  Patient  Verified  Payor: [place] / Plan: 1500 / Product Type: Medicaid /    In time:1:03  Out time:2:24  Total Treatment Time (min): 81  Total Timed Codes (min): 64  1:1 Treatment Time (min): 30   Visit #: 7 of 12    Treatment Area: Strain of muscle, fascia and tendon of lower back, initial encounter [R86.972A]  Strain of muscle, fascia and tendon at neck level, initial encounter [S16. 1XXA]  Radiculopathy, lumbar region [M54.16]  Pain in right knee [M25.561]    SUBJECTIVE  Pain Level (0-10 scale): 9/10  Any medication changes, allergies to medications, adverse drug reactions, diagnosis change, or new procedure performed?: [x] No    [] Yes (see summary sheet for update)  Subjective functional status/changes:   [] No changes reported  I am having a bad day today, my whole neck and back is killing me today, but I don't remember doing anything different that would have flared it up.     OBJECTIVE  Modality rationale: decrease pain and increase tissue extensibility to improve the patients ability to improve functional abilities    Min Type Additional Details    [] Estim: []Att   []Unatt        []TENS instruct                  []IFC  []Premod   []NMES                     []Other:  []w/US   []w/ice   []w/heat  Position:  Location:    []  Traction: [] Cervical       []Lumbar                       [] Prone          []Supine                       []Intermittent   []Continuous Lbs:  [] before manual  [] after manual    []  Ultrasound: []Continuous   [] Pulsed                           []1MHz   []3MHz Location:  W/cm2:    []  Iontophoresis with dexamethasone         Location: [] Take home patch   [] In clinic   10 []  Ice     [x]  heat  []  Ice massage Position:supine  Location:cervical/lumbar    []  Vasopneumatic Device Pressure:       [] lo [] med [] hi   Temperature: [] lo [] med [] hi   [] Skin assessment post-treatment:  []intact []redness- no adverse reaction       []redness  adverse reaction:       71 min Therapeutic Exercise:  [x] See flow sheet :   Rationale: increase ROM, increase strength, improve balance and increase proprioception to improve the patients ability to improve functional abilities         min Patient Education: [x] Review HEP    [] Progressed/Changed HEP based on:   [] positioning   [] body mechanics   [] transfers   [] heat/ice application        Other Objective/Functional Measures:     Pain Level (0-10 scale) post treatment: 0    ASSESSMENT/Changes in Function: Pt presented with chief c/o increased cervical and lumbar pain/sxs with no specific change/increase in activity since last tx, but was able to tolerate full therex regiment including advancing to level 2 dead bug stabs with min to mod challenge today. Will continue to progress/advance patient within current POC as tolerated with monitoring symptoms. Patient will continue to benefit from skilled PT services to modify and progress therapeutic interventions, address functional mobility deficits, address ROM deficits, address strength deficits, analyze and address soft tissue restrictions, analyze and cue movement patterns, analyze and modify body mechanics/ergonomics and assess and modify postural abnormalities to attain remaining goals.      []  See Plan of Care  []  See progress note/recertification  []  See Discharge Summary         Progress towards goals / Updated goals:      PLAN  [x]  Upgrade activities as tolerated     []  Continue plan of care  []  Update interventions per flow sheet       []  Discharge due to:_  []  Other:_      Peewee Arroyo, PTA 2/9/2018  1:07 PM

## 2018-02-13 ENCOUNTER — HOSPITAL ENCOUNTER (OUTPATIENT)
Dept: PHYSICAL THERAPY | Age: 63
Discharge: HOME | End: 2018-02-13
Payer: MEDICARE

## 2018-02-13 PROCEDURE — 97110 THERAPEUTIC EXERCISES: CPT

## 2018-02-13 NOTE — PROGRESS NOTES
PT DAILY TREATMENT NOTE     Patient Name: Rui Jaramillo  Date:2018  : 1955  [x]  Patient  Verified  Payor: [place] / Plan: 1500 / Product Type: Medicaid /    In time:1:02  Out time:2:30  Total Treatment Time (min): 88  Total Timed Codes (min): 61  1:1 Treatment Time (min): 30   Visit #: 8 of 12    Treatment Area: Strain of muscle, fascia and tendon of lower back, initial encounter [W04.875Z]  Strain of muscle, fascia and tendon at neck level, initial encounter [S16. 1XXA]  Radiculopathy, lumbar region [M54.16]  Pain in right knee [M25.561]    SUBJECTIVE  Pain Level (0-10 scale): 6/10   Any medication changes, allergies to medications, adverse drug reactions, diagnosis change, or new procedure performed?: [x] No    [] Yes (see summary sheet for update)  Subjective functional status/changes:   [] No changes reported  My neck and back feels better than the last time that I was here, but everything usually feels the worst when I first get up in the morning and gets better as I get up and get moving around for some reason.     OBJECTIVE  Modality rationale: decrease pain and increase tissue extensibility to improve the patients ability to improve functional abilities   Min Type Additional Details    [] Estim: []Att   []Unatt        []TENS instruct                  []IFC  []Premod   []NMES                     []Other:  []w/US   []w/ice   []w/heat  Position:  Location:    []  Traction: [] Cervical       []Lumbar                       [] Prone          []Supine                       []Intermittent   []Continuous Lbs:  [] before manual  [] after manual    []  Ultrasound: []Continuous   [] Pulsed                           []1MHz   []3MHz Location:  W/cm2:    []  Iontophoresis with dexamethasone         Location: [] Take home patch   [] In clinic   10 []  Ice     [x]  heat  []  Ice massage Position:supine  Location:cervical/lumbar    []  Vasopneumatic Device Pressure:       [] lo [] med [] hi   Temperature: [] lo [] med [] hi   [] Skin assessment post-treatment:  []intact []redness- no adverse reaction       []redness  adverse reaction:       78 min Therapeutic Exercise:  [x] See flow sheet :   Rationale: increase ROM, increase strength, improve balance and increase proprioception to improve the patients ability to improve functional abilities           min Patient Education: [x] Review HEP    [] Progressed/Changed HEP based on:   [] positioning   [] body mechanics   [] transfers   [] heat/ice application        Other Objective/Functional Measures:     Pain Level (0-10 scale) post treatment: 0    ASSESSMENT/Changes in Function: Pt presented with decreased intensity of cervical and lumbar symptoms with ADL's since last treatment with most increase in sxs in the AM that decreases with increased activity. Will review detailed progress/goals for physician update next treatment with continuing to progress/advance within current POC/protocol as tolerated. Patient will continue to benefit from skilled PT services to modify and progress therapeutic interventions, address functional mobility deficits, address ROM deficits, address strength deficits, analyze and address soft tissue restrictions, analyze and cue movement patterns, analyze and modify body mechanics/ergonomics and assess and modify postural abnormalities to attain remaining goals.      []  See Plan of Care  []  See progress note/recertification  []  See Discharge Summary         Progress towards goals / Updated goals:      PLAN  [x]  Upgrade activities as tolerated     []  Continue plan of care  []  Update interventions per flow sheet       []  Discharge due to:_  []  Other:_      Kya Del Cid, PTA 2/13/2018  1:01 PM

## 2018-02-16 ENCOUNTER — HOSPITAL ENCOUNTER (OUTPATIENT)
Dept: PHYSICAL THERAPY | Age: 63
Discharge: HOME | End: 2018-02-16
Payer: MEDICARE

## 2018-02-16 PROCEDURE — 97110 THERAPEUTIC EXERCISES: CPT

## 2018-02-16 PROCEDURE — G8978 MOBILITY CURRENT STATUS: HCPCS

## 2018-02-16 PROCEDURE — G8979 MOBILITY GOAL STATUS: HCPCS

## 2018-02-16 NOTE — PROGRESS NOTES
[facility] [place] [address], [place], [address] [place] [address] Road  Phone: [phone]   [phone]  PROGRESS NOTE  Patient Name: Sacha Dobbins : 1955   Treatment/Medical Diagnosis: Strain of muscle, fascia and tendon of lower back, initial encounter [S39.012A]  Strain of muscle, fascia and tendon at neck level, initial encounter [S16. 1XXA]  Radiculopathy, lumbar region [M54.16]  Pain in right knee [M25.561]   Referral Source: Annita Sierra MD     Date of Initial Visit: 18 Attended Visits: 9 Missed Visits: 1     SUMMARY OF TREATMENT  Therapeutic exercise for lumbar mobility, lumbopelvic/core stabilization, balance/proprioceptive awareness, moist heat and HEP. CURRENT STATUS  Patient reports approximately 75% overall improvement from therapy since initial evaluation with 6/10 pain level average, increased to 8/10 at the worst in cervical region and R UE with prolonged sitting especially with forward flexion and reaching activities. Pt is making steady progress with advancing within current POC with increased lumbopelvic/core strength, but would benefit from continued therapy for 9 more visits to achieve maximum medical benefit/potential from current POC. Will continue to progress/advance patient within current POC as tolerated with monitoring symptoms. Goal/Measure of Progress Goal Met? 1. Improve BL hamstring flexibility (>45 degree SLR) to improve lumbopelvic mobility   Status at last Eval: Progressing Current Status: Met yes   2. Pt will report at least 25-50% functional improvement with moderate intensity house activities (lifting, vacuuming)   Status at last Eval: Progressing Current Status: 65-70% improvement reported yes   3. Able to balance in sharpened Romberg 30 seconds to demonstrate improved dynamic balance and safety   Status at last Eval: Progressing Current Status: Met yes   4.   Pt will report at least 25-50% improvement with quality of stair negotiation   Status at last Eval: Progressing  Current Status: 65-70% improvement reported yes     New Goals to be achieved in __9__  treatments:  1. Pt will be able to perform prolonged seated forward flexion/reaching ADL's as needed/within reason without increasing cervical pain >6/10 for increased functional abilities with ADL's.   2.  Able to balance in sharpened Romberg 30 seconds while standing on foam to demonstrate improved dynamic balance and safety. 3.  Pt will demonstrate at least 10-15 point improvement with Foto score since last assessment to indicate increased functional abilities. 4.  Pt will be given and instructed on updated HEP for continued maintenance of decreased symptoms and improved strength/functional after discharge from therapy. G-codes=Mobility [id number] Current  CK= 40-59%   Goal  CK= 40-59%. The severity rating is based on the FOTO Score  RECOMMENDATIONS  Continue with current POC for 3 remaining visits left on current script plus 6 additional visits (9 total visits) with advancing as tolerated, then reassess for the need for continuation or discharge from therapy. If you have any questions/comments please contact us directly at [phone]. Thank you for allowing us to assist in the care of your patient. LPTA Signature: Candance Sheller, PTA  Date: 2/16/2018   PT Signature:  Time: 1:03 PM   NOTE TO PHYSICIAN:  PLEASE COMPLETE THE ORDERS BELOW AND FAX TO   [facility] UNIT: [phone].   If you are unable to process this request in 24 hours please contact our office: [phone].    ___ I have read the above report and request that my patient continue as recommended.   ___ I have read the above report and request that my patient continue therapy with the following changes/special instructions:_________________________________________________________   ___ I have read the above report and request that my patient be discharged from therapy.      Physician Signature:        Date:       Time:

## 2018-02-16 NOTE — PROGRESS NOTES
PT DAILY TREATMENT NOTE 8    Patient Name: Dawn Gutierrez  Date:2018  : 1955  [x]  Patient  Verified  Payor: [place] / Plan: 1500 / Product Type: Medicaid /    In time:1:07  Out time:2:30  Total Treatment Time (min): 83  Total Timed Codes (min): 76  1:1 Treatment Time (min): 53   Visit #: 9 of 12    Treatment Area: Strain of muscle, fascia and tendon of lower back, initial encounter [J92.398C]  Strain of muscle, fascia and tendon at neck level, initial encounter [S16. 1XXA]  Radiculopathy, lumbar region [M54.16]  Pain in right knee [M25.561]    SUBJECTIVE  Pain Level (0-10 scale): 5/10  Any medication changes, allergies to medications, adverse drug reactions, diagnosis change, or new procedure performed?: [x] No    [] Yes (see summary sheet for update)  Subjective functional status/changes:   [] No changes reported  My neck and back are actually feeling a little bit better today, but my lower back and buttocks have been feeling sore and my abdominal region has been tight. OBJECTIVE      83 min Therapeutic Exercise:  [x] See flow sheet :   Rationale: increase ROM, increase strength, improve balance and increase proprioception to improve the patients ability to improve functional abilities           min Patient Education: [x] Review HEP    [] Progressed/Changed HEP based on:   [] positioning   [] body mechanics   [] transfers   [] heat/ice application        Other Objective/Functional Measures:     Pain Level (0-10 scale) post treatment: 0    ASSESSMENT/Changes in Function:     Patient will continue to benefit from skilled PT services to modify and progress therapeutic interventions, address functional mobility deficits, address ROM deficits, address strength deficits, analyze and cue movement patterns, analyze and modify body mechanics/ergonomics and assess and modify postural abnormalities to attain remaining goals.      []  See Plan of Care  [x]  See progress note/recertification  []  See Discharge Summary         Progress towards goals / Updated goals:  See Progress note/Physician update for full detailed progress towards established goals.     PLAN  [x]  Upgrade activities as tolerated     []  Continue plan of care  []  Update interventions per flow sheet       []  Discharge due to:_  []  Other:_      Ame Crespo, KATIE 2/16/2018  1:00 PM

## 2018-02-20 ENCOUNTER — HOSPITAL ENCOUNTER (OUTPATIENT)
Dept: PHYSICAL THERAPY | Age: 63
Discharge: HOME | End: 2018-02-20
Payer: MEDICARE

## 2018-02-20 PROCEDURE — 97110 THERAPEUTIC EXERCISES: CPT

## 2018-02-20 NOTE — PROGRESS NOTES
PT DAILY TREATMENT NOTE 8    Patient Name: Hayde Levy  Date:2018  : 1955  [x]  Patient  Verified  Payor: [place] / Plan: 1500 / Product Type: Medicaid /    In time:1:03  Out time:2:17  Total Treatment Time (min): 74  Total Timed Codes (min): 67  1:1 Treatment Time (min):    Visit #: 10 of 12    Treatment Area: Strain of muscle, fascia and tendon of lower back, initial encounter [K81.473N]  Strain of muscle, fascia and tendon at neck level, initial encounter [S16. 1XXA]  Radiculopathy, lumbar region [M54.16]  Pain in right knee [M25.561]    SUBJECTIVE  Pain Level (0-10 scale): 7-8/10  Any medication changes, allergies to medications, adverse drug reactions, diagnosis change, or new procedure performed?: [x] No    [] Yes (see summary sheet for update)  Subjective functional status/changes:   [] No changes reported  The pain varies in my back and leg depending on what I do as well as when I am sitting compared to when I am standing. OBJECTIVE      74 min Therapeutic Exercise:  [x] See flow sheet :   Rationale: increase ROM, increase strength, improve balance and increase proprioception to improve the patients ability to improve functional abilities           min Patient Education: [x] Review HEP    [] Progressed/Changed HEP based on:   [] positioning   [] body mechanics   [] transfers   [] heat/ice application        Other Objective/Functional Measures:     Pain Level (0-10 scale) post treatment: 6/10    ASSESSMENT/Changes in Function: Pt presents with chief c/o inconsistent/intermittent R lumbar quadrant and LE radicular pain with prolonged sitting and standing ADL's since last treatment. Pt was also able to advance to performing sharpened romberg stance on foam with mod to max challenge with proprioceptive/balance awareness today. Will continue to progress/advance patient within current POC as tolerated with monitoring symptoms.     Patient will continue to benefit from skilled PT services to modify and progress therapeutic interventions, address functional mobility deficits, address ROM deficits, address strength deficits, analyze and cue movement patterns, analyze and modify body mechanics/ergonomics and assess and modify postural abnormalities to attain remaining goals.      []  See Plan of Care  []  See progress note/recertification  []  See Discharge Summary         Progress towards goals / Updated goals:      PLAN  []  Upgrade activities as tolerated     []  Continue plan of care  []  Update interventions per flow sheet       []  Discharge due to:_  []  Other:_      Phuong Emery, KATIE 2/20/2018  1:02 PM

## 2018-02-23 ENCOUNTER — HOSPITAL ENCOUNTER (OUTPATIENT)
Dept: PHYSICAL THERAPY | Age: 63
Discharge: HOME | End: 2018-02-23
Payer: MEDICARE

## 2018-02-23 PROCEDURE — 97110 THERAPEUTIC EXERCISES: CPT

## 2018-02-23 NOTE — PROGRESS NOTES
PT DAILY TREATMENT NOTE 8-    Patient Name: Cameron Deluna  Date:2018  : 1955  [x]  Patient  Verified  Payor: [address] / Plan: [address] / Product Type: Managed Care Medicare /    In time:1:03  Out time:2:26  Total Treatment Time (min): 83  Total Timed Codes (min): 66  1:1 Treatment Time (min):    Visit #: 11 of 12    Treatment Area: Strain of muscle, fascia and tendon of lower back, initial encounter [L19.907O]  Strain of muscle, fascia and tendon at neck level, initial encounter [S16. 1XXA]  Radiculopathy, lumbar region [M54.16]  Pain in right knee [M25.561]    SUBJECTIVE  Pain Level (0-10 scale): 5-6/10  Any medication changes, allergies to medications, adverse drug reactions, diagnosis change, or new procedure performed?: [x] No    [] Yes (see summary sheet for update)  Subjective functional status/changes:   [] No changes reported  I don't feel as much pain on my right side lately, but I still feel those electric shocks going from my hip and down my leg on and off.     OBJECTIVE    Modality rationale: decrease pain and increase tissue extensibility to improve the patients ability to improve functional abilities   Min Type Additional Details     [] Estim: []Att   []Unatt        []TENS instruct                  []IFC  []Premod   []NMES                     []Other:  []w/US   []w/ice   []w/heat  Position:  Location:     []  Traction: [] Cervical       []Lumbar                       [] Prone          []Supine                       []Intermittent   []Continuous Lbs:  [] before manual  [] after manual     []  Ultrasound: []Continuous   [] Pulsed                           []1MHz   []3MHz Location:  W/cm2:     []  Iontophoresis with dexamethasone         Location: [] Take home patch   [] In clinic   10 []  Ice     [x]  heat  []  Ice massage Position:seated  Location:cervical/lumbar     []  Vasopneumatic Device Pressure:       [] lo [] med [] hi Temperature: [] lo [] med [] hi   [] Skin assessment post-treatment:  []intact []redness- no adverse reaction         73 min Therapeutic Exercise:  [x] See flow sheet :   Rationale: increase ROM, increase strength, improve balance and increase proprioception to improve the patients ability to improve functional abilities           min Patient Education: [x] Review HEP    [] Progressed/Changed HEP based on:   [] positioning   [] body mechanics   [] transfers   [] heat/ice application        Other Objective/Functional Measures:     Pain Level (0-10 scale) post treatment: 0    ASSESSMENT/Changes in Function: Pt presents with decreased intensity of pain/sxs overall on right side with ADL's except for intermittent shooting neurologic pain in R LE since last treatment. Pt is making steady progress with advancing within current POC. Will continue to progress/advance patient within current POC as tolerated with monitoring symptoms. Patient will continue to benefit from skilled PT services to modify and progress therapeutic interventions, address functional mobility deficits, address ROM deficits, address strength deficits, analyze and cue movement patterns, analyze and modify body mechanics/ergonomics and assess and modify postural abnormalities to attain remaining goals.      []  See Plan of Care  []  See progress note/recertification  []  See Discharge Summary         Progress towards goals / Updated goals:      PLAN  [x]  Upgrade activities as tolerated     []  Continue plan of care  []  Update interventions per flow sheet       []  Discharge due to:_  []  Other:_      Coy Corral, PTA 2/23/2018  1:06 PM

## 2018-02-26 ENCOUNTER — HOSPITAL ENCOUNTER (OUTPATIENT)
Dept: PHYSICAL THERAPY | Age: 63
Discharge: HOME | End: 2018-02-26
Payer: MEDICARE

## 2018-02-26 PROCEDURE — 97110 THERAPEUTIC EXERCISES: CPT

## 2018-02-26 NOTE — PROGRESS NOTES
PT DAILY TREATMENT NOTE 8-    Patient Name: Val Antoine  Date:2018  : 1955  [x]  Patient  Verified  Payor: [address] / Plan: [address] / Product Type: Managed Care Medicare /    In time:1:02  Out time:2:15  Total Treatment Time (min): 73  Total Timed Codes (min): 56  1:1 Treatment Time (min): 30   Visit #: 12 of 18    Treatment Area: Strain of muscle, fascia and tendon of lower back, initial encounter [H02.264B]  Strain of muscle, fascia and tendon at neck level, initial encounter [S16. 1XXA]  Radiculopathy, lumbar region [M54.16]  Pain in right knee [M25.561]    SUBJECTIVE  Pain Level (0-10 scale): 5/10  Any medication changes, allergies to medications, adverse drug reactions, diagnosis change, or new procedure performed?: [x] No    [] Yes (see summary sheet for update)  Subjective functional status/changes:   [] No changes reported  I did a little workout at the [employer] this weekend and my arm is more sore than anything, but I used some muscle rub on my right arm and leg and it felt better.     OBJECTIVE  Modality rationale: decrease pain and increase tissue extensibility to improve the patients ability to improve functional abilities   Min Type Additional Details    [] Estim: []Att   []Unatt        []TENS instruct                  []IFC  []Premod   []NMES                     []Other:  []w/US   []w/ice   []w/heat  Position:  Location:    []  Traction: [] Cervical       []Lumbar                       [] Prone          []Supine                       []Intermittent   []Continuous Lbs:  [] before manual  [] after manual    []  Ultrasound: []Continuous   [] Pulsed                           []1MHz   []3MHz Location:  W/cm2:    []  Iontophoresis with dexamethasone         Location: [] Take home patch   [] In clinic   10 []  Ice     [x]  heat  []  Ice massage Position:seated  Location:cervical/lumbar    []  Vasopneumatic Device Pressure: [] lo [] med [] hi   Temperature: [] lo [] med [] hi   [] Skin assessment post-treatment:  []intact []redness- no adverse reaction       []redness  adverse reaction:       63 min Therapeutic Exercise:  [x] See flow sheet :   Rationale: increase ROM, increase strength, improve balance and increase proprioception to improve the patients ability to improve functional abilities           min Patient Education: [x] Review HEP    [] Progressed/Changed HEP based on:   [] positioning   [] body mechanics   [] transfers   [] heat/ice application        Other Objective/Functional Measures:     Pain Level (0-10 scale) post treatment: 0    ASSESSMENT/Changes in Function: Pt was able to increase time interval from 30 to 45 seconds with sharpened romberg stance on foam with moderate challenge with proprioceptive/balance awareness today. Will continue to progress/advance patient within current POC as tolerated with monitoring symptoms. Patient will continue to benefit from skilled PT services to modify and progress therapeutic interventions, address functional mobility deficits, address ROM deficits, address strength deficits, analyze and cue movement patterns, analyze and modify body mechanics/ergonomics and assess and modify postural abnormalities to attain remaining goals.      []  See Plan of Care  []  See progress note/recertification  []  See Discharge Summary         Progress towards goals / Updated goals:      PLAN  [x]  Upgrade activities as tolerated     []  Continue plan of care  []  Update interventions per flow sheet       []  Discharge due to:_  []  Other:_      Shanna Boo, KATIE 2/26/2018  1:00 PM

## 2018-03-01 ENCOUNTER — HOSPITAL ENCOUNTER (OUTPATIENT)
Dept: PHYSICAL THERAPY | Age: 63
Discharge: HOME | End: 2018-03-01
Payer: MEDICARE

## 2018-03-01 PROCEDURE — 97110 THERAPEUTIC EXERCISES: CPT

## 2018-03-01 NOTE — PROGRESS NOTES
PT DAILY TREATMENT NOTE 8-    Patient Name: Remigio Shone  Date:3/1/2018  : 1955  [x]  Patient  Verified  Payor: [address] / Plan: [address] / Product Type: Managed Care Medicare /    In time:1:30  Out time:2:45  Total Treatment Time (min): 75  Total Timed Codes (min): 65  1:1 Treatment Time (min): 65   Visit #: 13 of 18    Treatment Area: Strain of muscle, fascia and tendon of lower back, initial encounter [C47.010V]  Strain of muscle, fascia and tendon at neck level, initial encounter [S16. 1XXA]  Radiculopathy, lumbar region [M54.16]  Pain in right knee [M25.561]    SUBJECTIVE  Pain Level (0-10 scale): 6/10  Any medication changes, allergies to medications, adverse drug reactions, diagnosis change, or new procedure performed?: [x] No    [] Yes (see summary sheet for update)  Subjective functional status/changes:   [] No changes reported  Patient reports he had pain last night to right glutius salvador that radiated distally to right knee and anterior. Patient states that he had pain to left leg as well.     OBJECTIVE  Modality rationale: decrease pain and increase tissue extensibility to improve the patients ability to change upper limb position for lifting, reaching and dressing tasks     Min Type Additional Details    [] Estim: []Att   []Unatt        []TENS instruct                  []IFC  []Premod   []NMES                     []Other:  []w/US   []w/ice   []w/heat  Position:  Location:    []  Traction: [] Cervical       []Lumbar                       [] Prone          []Supine                       []Intermittent   []Continuous Lbs:  [] before manual  [] after manual    []  Ultrasound: []Continuous   [] Pulsed                           []1MHz   []3MHz Location:  W/cm2:    []  Iontophoresis with dexamethasone         Location: [] Take home patch   [] In clinic   10 []  Ice     [x]  heat  []  Ice massage Position: Supine  Location:Cervical and lumbar    []  Vasopneumatic Device Pressure:       [] lo [] med [] hi   Temperature: [] lo [] med [] hi   [x] Skin assessment post-treatment:  [x]intact []redness- no adverse reaction       []redness  adverse reaction:       65 min Therapeutic Exercise:  [x] See flow sheet :   Rationale: increase ROM, increase strength, improve coordination and improve balance to improve the patients ability to improve the patients ability to change and maintain body/trunk positions    Other Objective/Functional Measures:     Pain Level (0-10 scale) post treatment: 2/10    ASSESSMENT/Changes in Function: Patient is progressing well with treatment and noticed improvement with pain overall. Patient will continue to benefit from skilled PT services to address functional mobility deficits, address ROM deficits, address strength deficits and analyze and address soft tissue restrictions to attain remaining goals.      PLAN  [x]  Upgrade activities as tolerated     [x]  Continue plan of care  []  Update interventions per flow sheet       []  Discharge due to:_  []  Other:_      Aidee Choi, PT 3/1/2018  1:34 PM

## 2018-03-06 ENCOUNTER — HOSPITAL ENCOUNTER (OUTPATIENT)
Dept: PHYSICAL THERAPY | Age: 63
Discharge: HOME | End: 2018-03-06
Payer: MEDICARE

## 2018-03-06 PROCEDURE — 97110 THERAPEUTIC EXERCISES: CPT

## 2018-03-08 ENCOUNTER — HOSPITAL ENCOUNTER (OUTPATIENT)
Dept: PHYSICAL THERAPY | Age: 63
Discharge: HOME | End: 2018-03-08
Payer: MEDICARE

## 2018-03-08 PROCEDURE — 97110 THERAPEUTIC EXERCISES: CPT

## 2018-03-08 PROCEDURE — G8979 MOBILITY GOAL STATUS: HCPCS

## 2018-03-08 PROCEDURE — G8980 MOBILITY D/C STATUS: HCPCS

## 2018-03-08 PROCEDURE — G8978 MOBILITY CURRENT STATUS: HCPCS

## 2018-03-08 PROCEDURE — 97112 NEUROMUSCULAR REEDUCATION: CPT

## 2018-03-08 NOTE — PROGRESS NOTES
[facility] [place] [address], [place], [address] [place] [address] Road  Phone: [phone]   [phone]  DISCHARGE SUMMARY  Patient Name: Jaden Lobo : 1955   Treatment/Medical Diagnosis: Strain of muscle, fascia and tendon of lower back, initial encounter [X60.229G]  Strain of muscle, fascia and tendon at neck level, initial encounter [S16. 1XXA]  Radiculopathy, lumbar region [M54.16]  Pain in right knee [M25.561]   Referral Source: Michelle Harvey MD     Date of Initial Visit: 2018 Attended Visits: 15 Missed Visits: 1     SUMMARY OF TREATMENT  Patient has been performing balance exercises. Shoulder and trunk strengthening with stability muscular control exercises. CURRENT STATUS  Patient is independent with therapy and demonstrates ability to perform self care for maintenance of improved function and pain reduction. New Goals to be achieved in __9__  treatments:  1. Pt will be able to perform prolonged seated forward flexion/reaching ADL's as needed/within reason without increasing cervical pain >6/10 for increased functional abilities with ADL's. MET   2. Able to balance in sharpened Romberg 30 seconds while standing on foam to demonstrate improved dynamic balance and safety. MET   3. Pt will demonstrate at least 10-15 point improvement with Foto score since last assessment to indicate increased functional abilities. MET   4. Pt will be given and instructed on updated HEP for continued maintenance of decreased symptoms and improved strength/functional after discharge from therapy. MET   G-codes= Mobility: [id number] Current  CL= 60-79%. The severity rating is based on the FOTO Score  The severity rating is based on the FOTO Score      RECOMMENDATIONS  Discontinue therapy. Progressing towards or have reached established goals. If you have any questions/comments please contact us directly at [phone].    Thank you for allowing us to assist in the care of your patient.     Therapist Signature: Denny Fuller, PT Date: 3/08/2018     Time: 2:18 PM

## 2018-03-08 NOTE — PROGRESS NOTES
PT DAILY TREATMENT NOTE     Patient Name: Manoj Curran  Date:3/8/2018  : 1955  [x]  Patient  Verified  Payor: [address] / Plan: [address] / Product Type: Managed Care Medicare /    In time:1:27  Out time:2:40  Total Treatment Time (min): 73  Total Timed Codes (min): 63  1:1 Treatment Time (min): 63   Visit #: 15 of 18    Treatment Area: Strain of muscle, fascia and tendon of lower back, initial encounter [Q93.077W]  Strain of muscle, fascia and tendon at neck level, initial encounter [S16. 1XXA]  Radiculopathy, lumbar region [M54.16]  Pain in right knee [M25.561]    SUBJECTIVE  Pain Level (0-10 scale): 5/10  Any medication changes, allergies to medications, adverse drug reactions, diagnosis change, or new procedure performed?: [x] No    [] Yes (see summary sheet for update)  Subjective functional status/changes:   [] No changes reported  Patient reports some discomfort to shoulder after performing last treatment session.     OBJECTIVE  Modality rationale: decrease pain and increase tissue extensibility to improve the patients ability to improve the patients ability to change and maintain body/trunk positions     Min Type Additional Details    [] Estim: []Att   []Unatt        []TENS instruct                  []IFC  []Premod   []NMES                     []Other:  []w/US   []w/ice   []w/heat  Position:  Location:    []  Traction: [] Cervical       []Lumbar                       [] Prone          []Supine                       []Intermittent   []Continuous Lbs:  [] before manual  [] after manual    []  Ultrasound: []Continuous   [] Pulsed                           []1MHz   []3MHz Location:  W/cm2:    []  Iontophoresis with dexamethasone         Location: [] Take home patch   [] In clinic   10 []  Ice     [x]  heat  []  Ice massage Position: Seated  Location: Lumbar and cervical    []  Vasopneumatic Device Pressure:       [] lo [] med [] hi Temperature: [] lo [] med [] hi   [x] Skin assessment post-treatment:  [x]intact []redness- no adverse reaction       []redness  adverse reaction:       53 min Therapeutic Exercise:  [] See flow sheet :   Rationale: increase strength, improve coordination and improve balance to improve the patients ability to improve the patients ability to change and maintain body/trunk positions and perform gait and other dynamic movement activities     10 min Neuromuscular Re-education:  []  See flow sheet :   Rationale: increase ROM, increase strength and improve balance  to improve the patients ability to perform gait and other dynamic movement activities    Other Objective/Functional Measures:     Pain Level (0-10 scale) post treatment: 0/10    ASSESSMENT/Changes in Function: Patient demonstrates independent with exercises with biggest complaint of pain being limiting factor. Increased resistance on bicycle with patient unable to perform greater than 3 minutes before having to use hands to help. Patient will continue to benefit from skilled PT services to address functional mobility deficits, address strength deficits and analyze and address soft tissue restrictions to attain remaining goals.      PLAN  [x]  Upgrade activities as tolerated     [x]  Continue plan of care  []  Update interventions per flow sheet       []  Discharge due to:_  []  Other:_      Peg [facility], PT 3/8/2018  2:01 PM

## 2018-05-18 ENCOUNTER — HOSPITAL ENCOUNTER (OUTPATIENT)
Dept: PHYSICAL THERAPY | Age: 63
Discharge: HOME | End: 2018-05-18
Payer: MEDICARE

## 2018-05-18 PROCEDURE — G8978 MOBILITY CURRENT STATUS: HCPCS

## 2018-05-18 PROCEDURE — 97110 THERAPEUTIC EXERCISES: CPT

## 2018-05-18 PROCEDURE — 97162 PT EVAL MOD COMPLEX 30 MIN: CPT

## 2018-05-18 PROCEDURE — G8979 MOBILITY GOAL STATUS: HCPCS

## 2018-05-18 NOTE — PROGRESS NOTES
PT DAILY TREATMENT NOTE    Patient Name: Efrem Asa  Date:2018  : 1955  [x]  Patient  Verified  Payor: [address] / Plan: [facility] / Product Type: Managed Care Medicaid /    In time:12:32  Out time:1:40  Total Treatment Time (min): 68  Total Timed Codes (min): 68  1:1 Treatment Time ( only): 68   Visit #: 1 of 12    Physical Therapy Evaluation - Lumbar Spine     Patient is a 59year old male with chronic sciatica pain with radicular pain only to right. Patient reports pain began about 1 year ago with having chronic low back pain since accident. Patient states he recently had seen a neurologist and he performed an EMG study and determined that his radicular symptoms is coming from his hip region.     Patient reports history prostate cancer in  and has been cancer free since . Patient history of heart attack 2011 and a stroke about the same time frame.     Patient reported history of dizziness that has since resolved after his gabapentin dosage was increased. Pain is located to posterior right glute region; described as sharp and stinging  Current: 7/10; Worse: 8/10 - no consistent aggravating factor; Best 4/10 - eased with stretching, medication, sleeping         OBJECTIVE  Patient with bilateral hallux valgus  Right foot to rear with heels together  Patient with very poor single leg balance on firm surface EO. Normal gait  Normal pelvic translation with thoracolumbar flexion and extension  Patient with full thoracolumbar active motion in all directions. MMT: 5/5 BLE with exception to left hip flexion 4/5, right hip IR 4/5 and core strength 3+/5 requiring considerable cue and instruction to engage.   Flexibility: Very tight bilateral hamstring, quad, piriformis and hip ER  Negative lumbar compression/decompression  Positive SLR and slump    Other tests/comments:  OBJECTIVE  Therapeutic Exercise: [x] see flow sheet     [] Other:_  Added/Changed Exercises:  []  Added:_  to improve (function):  []  Changed:_ to improve (function):  (minutes:10 )    Other Objective/Functional Measures:   Pain Level (0-10 scale) post treatment: 5/10    ASSESSMENT  [x]  See Plan of Care    PLAN  See Plan of Care    Anthony Archuleta, PT 5/18/2018  2:28 PM

## 2018-05-18 NOTE — PROGRESS NOTES
[address] [facility] AT [address], [address] [place] [address] Road  Phone: [phone]   [phone]  PLAN OF CARE / [address] [place] [address] PHYSICAL THERAPY SERVICES  Patient Name: Cesar Perales : 1955   Medical   Diagnosis: Low back pain [M54.5] Treatment Diagnosis: Low back pain [M54.5]   Onset Date: About 1 year     Referral Source: Chayo Patel MD Start of [facility]): 2018   Prior Hospitalization: See medical history Provider #: [id number]   Prior Level of Function: Independent by taking medicaitons   Comorbidities: Hx of Cancer, stroke and heart attack   Medications: Verified on Patient Summary List   The Plan of Care and following information is based on the information from the initial evaluation.   ===========================================================================================  Assessment / key information:  Patient is a 59year old male with chronic sciatica pain with radicular pain only to right. Patient reports pain began about 1 year ago with having chronic low back pain since accident. Patient states he recently had seen a neurologist and he performed an EMG study and determined that his radicular symptoms is coming from his hip region. Patient reports history prostate cancer in  and has been cancer free since . Patient history of heart attack 2011 and a stroke about the same time frame. Patient reported history of dizziness that has since resolved after his gabapentin dosage was increased. Pain is located to posterior right glute region; described as sharp and stinging  Current: 7/10; Worse: 8/10 - no consistent aggravating factor; Best 4/10 - eased with stretching, medication, sleeping    Patient with bilateral hallux valgus  Right foot to rear with heels together  Patient with very poor single leg balance on firm surface EO.   Normal gait  Normal pelvic translation with thoracolumbar flexion and extension  Patient with full thoracolumbar active motion in all directions. MMT: 5/5 BLE with exception to left hip flexion 4/5, right hip IR 4/5 and core strength 3+/5 requiring considerable cue and instruction to engage. Flexibility: Very tight bilateral hamstring, quad, piriformis and hip ER  Negative lumbar compression/decompression  Positive SLR and slump    Patient to perform skilled physical therapy to improve flexibility, centralize radicular symptoms, improve strength and balance. Patient issued HEP to perform to address above deficits.    ===========================================================================================  History MEDIUM  Complexity : 1-2 comorbidities / personal factors will impact the outcome/ POC ; Examination MEDIUM Complexity : 3 Standardized tests and measures addressing body structure, function, activity limitation and / or participation in recreation ; Presentation MEDIUM Complexity : Evolving with changing characteristics ; Decision Making MEDIUM Complexity : FOTO score of 26-74; Complexity MEDIUM  Problem List: pain affecting function, decrease ROM, decrease strength and decrease ADL/ functional abilitiies   Treatment Plan may include any combination of the following: Therapeutic exercise, Physical agent/modality, Manual therapy, Patient education and Self Care training  Patient / Family readiness to learn indicated by: asking questions, trying to perform skills and interest  Persons(s) to be included in education: patient (P)  Barriers to Learning/Limitations: None  Measures taken:    Patient Goal (s): Decrease radicular pain   Patient self reported health status: fair  Rehabilitation Potential: fair    · Short Term Goals: To be accomplished in  4  treatments:   Independent/compliant with long term HEP for posture and flexibility  Patient will be educated on spinal posture modification to reduce musculoskeletal strain with home and work ADL's  Patient to report centralization of radicular symptoms to low back in order to reduce patient pain. · Long Term Goals: To be accomplished in  8  treatments:  Improve FOTO outcome score by 9% to indicate a significant improvement in ADL function  Pt will report  25-50% improvement with prolonged standing and walking ADL's  Pt will report 50% improvement with lower extremity flexibility to reduce right radicular symptoms. Frequency / Duration:   Patient to be seen  2  times per week for 6  weeks:  Patient / Caregiver education and instruction: self care, activity modification and exercises  G-Codes (GP): Mobility: [id number] Current  CK= 40-59%   Goal  CL= 60-79%. The severity rating is based on the FOTO Score  Therapist Signature: Crow Meza PT Date: 8/17/2793   Certification Period: 5/18/18 to 8/17/18 Time: 12:27 PM   ===========================================================================================  I certify that the above Physical Therapy Services are being furnished while the patient is under my care. I agree with the treatment plan and certify that this therapy is necessary. Physician Signature:        Date:       Time:     Please sign and return to [facility] or you may fax the signed copy to [phone]. Thank you.

## 2018-05-23 ENCOUNTER — HOSPITAL ENCOUNTER (OUTPATIENT)
Dept: PHYSICAL THERAPY | Age: 63
Discharge: HOME | End: 2018-05-23
Payer: MEDICARE

## 2018-05-23 PROCEDURE — 97110 THERAPEUTIC EXERCISES: CPT

## 2018-05-23 PROCEDURE — 97140 MANUAL THERAPY 1/> REGIONS: CPT

## 2018-05-23 NOTE — PROGRESS NOTES
PT DAILY TREATMENT NOTE 8    Patient Name: Dayo Hollingsworth  Date:2018  : 1955  [x]  Patient  Verified  Payor: [address] / Plan: [address] [facility] / Product Type: Managed Care Medicaid /    In time:11:28  Out time:12:27  Total Treatment Time (min): 59  Total Timed Codes (min): 44  1:1 Treatment Time (min): 44   Visit #: 2 of 12    Treatment Area: Low back pain [M54.5]    SUBJECTIVE  Pain Level (0-10 scale): 7/10  Any medication changes, allergies to medications, adverse drug reactions, diagnosis change, or new procedure performed?: [x] No    [] Yes (see summary sheet for update)  Subjective functional status/changes:   [] No changes reported  I feel most of the pain from the right side of my lower back traveling down my leg, but it is random when I feel it, because I feel it with all kinds of different positions and activities.     OBJECTIVE  Modality rationale: decrease pain and increase tissue extensibility to improve the patients ability to improve functional abilities   Min Type Additional Details    [] Estim: []Att   []Unatt        []TENS instruct                  []IFC  []Premod   []NMES                     []Other:  []w/US   []w/ice   []w/heat  Position:  Location:    []  Traction: [] Cervical       []Lumbar                       [] Prone          []Supine                       []Intermittent   []Continuous Lbs:  [] before manual  [] after manual    []  Ultrasound: []Continuous   [] Pulsed                           []1MHz   []3MHz Location:  W/cm2:    []  Iontophoresis with dexamethasone         Location: [] Take home patch   [] In clinic   10 []  Ice     [x]  heat  []  Ice massage Position:supine  Location:lumbar    []  Vasopneumatic Device Pressure:       [] lo [] med [] hi   Temperature: [] lo [] med [] hi   [] Skin assessment post-treatment:  []intact []redness- no adverse reaction       []redness  adverse reaction:       41 min Therapeutic Exercise:  [x] See flow sheet : Rationale: increase ROM and increase strength to improve the patients ability to improve functional abilities    8 min Manual Therapy:  Manual long axis right LE distraction, side lying right quadratus stretch with overpressure and hip flexor/quad combo stretching   Rationale: increase ROM and increase tissue extensibility to improve functional mobility           min Patient Education: [x] Review HEP    [] Progressed/Changed HEP based on:   [] positioning   [] body mechanics   [] transfers   [] heat/ice application        Other Objective/Functional Measures:     Pain Level (0-10 scale) post treatment: 0    ASSESSMENT/Changes in Function: Pt tolerated all new therex well upon trial today with chief c/o right lumbar quadrant and LE radicular pain with various positions/activities. Pt did not present with any apparent directional preference during treatment today and needs most focus on lumbar and LE flexibility with advancing lumbopelvic/core stabilization program as tolerated. Will continue to progress/advance patient within current POC as tolerated with monitoring symptoms. Patient will continue to benefit from skilled PT services to modify and progress therapeutic interventions, address functional mobility deficits, address ROM deficits, address strength deficits, analyze and address soft tissue restrictions, analyze and cue movement patterns, analyze and modify body mechanics/ergonomics and assess and modify postural abnormalities to attain remaining goals.      []  See Plan of Care  []  See progress note/recertification  []  See Discharge Summary         Progress towards goals / Updated goals:      PLAN  [x]  Upgrade activities as tolerated     []  Continue plan of care  []  Update interventions per flow sheet       []  Discharge due to:_  []  Other:_      Chanelle Bishop PTA 5/23/2018  11:27 AM

## 2018-05-25 ENCOUNTER — HOSPITAL ENCOUNTER (OUTPATIENT)
Dept: PHYSICAL THERAPY | Age: 63
Discharge: HOME | End: 2018-05-25
Payer: MEDICARE

## 2018-05-25 PROCEDURE — 97110 THERAPEUTIC EXERCISES: CPT

## 2018-05-25 PROCEDURE — 97140 MANUAL THERAPY 1/> REGIONS: CPT

## 2018-05-25 NOTE — PROGRESS NOTES
PT DAILY TREATMENT NOTE     Patient Name: Anila Fontenot  Date:2018  : 1955  [x]  Patient  Verified  Payor: [place] MED[place] / Plan: [place] MED[place]ARE PART A & B / Product Type: Medicare /    In time:2:39  Out time:4:07  Total Treatment Time (min): 88  Total Timed Codes (min): 73  1:1 Treatment Time (min): 57   Visit #: 3 of 12    Treatment Area: Low back pain [M54.5]    SUBJECTIVE  Pain Level (0-10 scale): 7-8/10  Any medication changes, allergies to medications, adverse drug reactions, diagnosis change, or new procedure performed?: [x] No    [] Yes (see summary sheet for update)  Subjective functional status/changes:   [] No changes reported  My back and leg was bothering me on and off while I was trying to sleep last night and the bottom of my right foot was even locking up. I tried taking the Gabapentin which didn't seen to help with that.      OBJECTIVE  Modality rationale: decrease pain and increase tissue extensibility to improve the patients ability to improve functional mobility    Min Type Additional Details    [] Estim: []Att   []Unatt        []TENS instruct                  []IFC  []Premod   []NMES                     []Other:  []w/US   []w/ice   []w/heat  Position:  Location:    []  Traction: [] Cervical       []Lumbar                       [] Prone          []Supine                       []Intermittent   []Continuous Lbs:  [] before manual  [] after manual    []  Ultrasound: []Continuous   [] Pulsed                           []1MHz   []3MHz Location:  W/cm2:    []  Iontophoresis with dexamethasone         Location: [] Take home patch   [] In clinic   10 []  Ice     [x]  heat  []  Ice massage Position:supine  Location:lumbar    []  Vasopneumatic Device Pressure:       [] lo [] med [] hi   Temperature: [] lo [] med [] hi   [] Skin assessment post-treatment:  []intact []redness- no adverse reaction       []redness  adverse reaction:       70 min Therapeutic Exercise:  [x] See flow sheet : Rationale: increase ROM and increase strength to improve the patients ability to improve functional abilities    8 min Manual Therapy:  Manual long axis right LE distraction, side lying right quadratus stretch with overpressure and hip flexor/quad combo stretching   Rationale: increase ROM and increase tissue extensibility to improve functional mobility           min Patient Education: [x] Review HEP    [] Progressed/Changed HEP based on:   [] positioning   [] body mechanics   [] transfers   [] heat/ice application        Other Objective/Functional Measures:     Pain Level (0-10 scale) post treatment: 0    ASSESSMENT/Changes in Function: Pt presented with chief c/o increased intermittent right lumbar and LE radicular pain as well as isolated foot spasms that interrupt sleep since last tx. Pt was able to tolerate full normal therex regiment including addition of bridge lifts and level 2 dead bug stabs with min to mod challenge today. Will continue to progress/advance patient within current POC as tolerated with monitoring symptoms. Patient will continue to benefit from skilled PT services to modify and progress therapeutic interventions, address functional mobility deficits, address ROM deficits, address strength deficits, analyze and address soft tissue restrictions, analyze and cue movement patterns, analyze and modify body mechanics/ergonomics and assess and modify postural abnormalities to attain remaining goals.      []  See Plan of Care  []  See progress note/recertification  []  See Discharge Summary         Progress towards goals / Updated goals:      PLAN  [x]  Upgrade activities as tolerated     []  Continue plan of care  []  Update interventions per flow sheet       []  Discharge due to:_  []  Other:_      Augustin Rosales, PTA 5/25/2018  2:52 PM

## 2018-05-30 ENCOUNTER — HOSPITAL ENCOUNTER (OUTPATIENT)
Dept: PHYSICAL THERAPY | Age: 63
Discharge: HOME | End: 2018-05-30
Payer: MEDICARE

## 2018-05-30 PROCEDURE — 97140 MANUAL THERAPY 1/> REGIONS: CPT

## 2018-05-30 PROCEDURE — 97110 THERAPEUTIC EXERCISES: CPT

## 2018-05-30 NOTE — PROGRESS NOTES
PT DAILY TREATMENT NOTE 8    Patient Name: Kimberly Lobato  Date:2018  : 1955  [x]  Patient  Verified  Payor: [employer] Plan / Plan: [place] MEDICARE PART A & B / Product Type: Medicare /    In time:2:30  Out time:3:40  Total Treatment Time (min): 70  Total Timed Codes (min): 60  1:1 Treatment Time (min): 60   Visit #: 4 of 12    Treatment Area: Low back pain [M54.5]    SUBJECTIVE  Pain Level (0-10 scale): 7.5/10  Any medication changes, allergies to medications, adverse drug reactions, diagnosis change, or new procedure performed?: [x] No    [] Yes (see summary sheet for update)  Subjective functional status/changes:   [] No changes reported  Patient reports no change in pain level with pain located to right groin and shooting distally into foot.     OBJECTIVE  Modality rationale: decrease pain to improve the patients ability to improve the patients ability to change and maintain body/trunk positions   Min Type Additional Details    [] Estim: []Att   []Unatt        []TENS instruct                  []IFC  []Premod   []NMES                     []Other:  []w/US   []w/ice   []w/heat  Position:  Location:    []  Traction: [] Cervical       []Lumbar                       [] Prone          []Supine                       []Intermittent   []Continuous Lbs:  [] before manual  [] after manual    []  Ultrasound: []Continuous   [] Pulsed                           []1MHz   []3MHz Location:  W/cm2:    []  Iontophoresis with dexamethasone         Location: [] Take home patch   [] In clinic   10 []  Ice     [x]  heat  []  Ice massage Position: Hooklying  Location: Low back    []  Vasopneumatic Device Pressure:       [] lo [] med [] hi   Temperature: [] lo [] med [] hi   [] Skin assessment post-treatment:  []intact []redness- no adverse reaction       []redness  adverse reaction:       50 min Therapeutic Exercise:  [x] See flow sheet :   Rationale: increase ROM and increase strength to improve the patients ability to improve the patients ability to change and maintain body/trunk positions    10 min Manual Therapy:  Bilateral hip and lower extremity passive stretching with MET   Rationale: increase ROM and increase tissue extensibility to improve the patients ability to change and maintain body/trunk positions    Other Objective/Functional Measures:     Pain Level (0-10 scale) post treatment: 5/10    ASSESSMENT/Changes in Function: Patient demonstrates good motion and function with minimal visible signs of pain even though he reports pain being at such a high level. Continue to progress patient exercises as he is able to tolerate them. Patient will continue to benefit from skilled PT services to address functional mobility deficits, address ROM deficits, address strength deficits and analyze and address soft tissue restrictions to attain remaining goals.      PLAN  [x]  Upgrade activities as tolerated     [x]  Continue plan of care  []  Update interventions per flow sheet       []  Discharge due to:_  []  Other:_      Margo Baker, PT 5/30/2018  3:11 PM

## 2018-06-01 ENCOUNTER — APPOINTMENT (OUTPATIENT)
Dept: PHYSICAL THERAPY | Age: 63
End: 2018-06-01
Payer: MEDICARE

## 2018-06-06 ENCOUNTER — HOSPITAL ENCOUNTER (OUTPATIENT)
Dept: PHYSICAL THERAPY | Age: 63
Discharge: HOME | End: 2018-06-06
Payer: MEDICARE

## 2018-06-06 PROCEDURE — 97110 THERAPEUTIC EXERCISES: CPT

## 2018-06-06 NOTE — PROGRESS NOTES
PT DAILY TREATMENT NOTE     Patient Name: Isela Alfaro  Date:2018  : 1955  [x]  Patient  Verified  Payor: Nestor Briggs / Plan: [address] [place] [address] / Product Type: Managed Care Medicare /    In time:2:40  Out time:4:03  Total Treatment Time (min): 83  Total Timed Codes (min): 73  1:1 Treatment Time (min): 30   Visit #: 5 of 12    Treatment Area: Low back pain [M54.5]    SUBJECTIVE  Pain Level (0-10 scale): 7/10  Any medication changes, allergies to medications, adverse drug reactions, diagnosis change, or new procedure performed?: [x] No    [] Yes (see summary sheet for update)  Subjective functional status/changes:   [] No changes reported  My back feels ok but my right leg has been really tight in the back from the back of my hip to my knee and I still get the muscle spasms periodically.     OBJECTIVE  Modality rationale: decrease pain and increase tissue extensibility to improve the patients ability to improve functional abilities   Min Type Additional Details    [] Estim: []Att   []Unatt        []TENS instruct                  []IFC  []Premod   []NMES                     []Other:  []w/US   []w/ice   []w/heat  Position:  Location:    []  Traction: [] Cervical       []Lumbar                       [] Prone          []Supine                       []Intermittent   []Continuous Lbs:  [] before manual  [] after manual    []  Ultrasound: []Continuous   [] Pulsed                           []1MHz   []3MHz Location:  W/cm2:    []  Iontophoresis with dexamethasone         Location: [] Take home patch   [] In clinic   10 []  Ice     [x]  heat  []  Ice massage Position:supine  Location:lumbar    []  Vasopneumatic Device Pressure:       [] lo [] med [] hi   Temperature: [] lo [] med [] hi   [] Skin assessment post-treatment:  []intact []redness- no adverse reaction       []redness  adverse reaction:       73 min Therapeutic Exercise:  [x] See flow sheet :   Rationale: increase ROM and increase strength to improve the patients ability to improve functional abilities      min Patient Education: [x] Review HEP    [] Progressed/Changed HEP based on:   [] positioning   [] body mechanics   [] transfers   [] heat/ice application        Other Objective/Functional Measures:     Pain Level (0-10 scale) post treatment: 0    ASSESSMENT/Changes in Function: Pt was able to advance to level 2 dead bug stabs and pelvic tilts with overhead medicine ball raises as well as seated stability ball stabs and marches with min to mod challenge today. Will continue to progress/advance patient within current POC as tolerated with monitoring symptoms. Patient will continue to benefit from skilled PT services to modify and progress therapeutic interventions, address functional mobility deficits, address ROM deficits, address strength deficits, analyze and address soft tissue restrictions, analyze and cue movement patterns, analyze and modify body mechanics/ergonomics and assess and modify postural abnormalities to attain remaining goals.      []  See Plan of Care  []  See progress note/recertification  []  See Discharge Summary         Progress towards goals / Updated goals:      PLAN  [x]  Upgrade activities as tolerated     []  Continue plan of care  []  Update interventions per flow sheet       []  Discharge due to:_  []  Other:_      Manjula Balderas, KATIE 6/6/2018  2:45 PM

## 2018-06-08 ENCOUNTER — HOSPITAL ENCOUNTER (OUTPATIENT)
Dept: PHYSICAL THERAPY | Age: 63
Discharge: HOME | End: 2018-06-08
Payer: MEDICARE

## 2018-06-08 PROCEDURE — 97110 THERAPEUTIC EXERCISES: CPT

## 2018-06-08 NOTE — PROGRESS NOTES
PT DAILY TREATMENT NOTE     Patient Name: Dayo Hollingsworth  Date:2018  : 1955  [x]  Patient  Verified  Payor: Toribio Gutierrez / Plan: [address] [place] [address] / Product Type: Managed Care Medicare /    In time:2:35  Out time:4:06  Total Treatment Time (min): 91  Total Timed Codes (min): 76  1:1 Treatment Time (min): 55   Visit #: 6 of 12    Treatment Area: Low back pain [M54.5]    SUBJECTIVE  Pain Level (0-10 scale): 8/10  Any medication changes, allergies to medications, adverse drug reactions, diagnosis change, or new procedure performed?: [x] No    [] Yes (see summary sheet for update)  Subjective functional status/changes:   [] No changes reported  The lower part of my right leg has been feeling better, but I still feel it in the top half of my leg and my lower back.     OBJECTIVE  Modality rationale: decrease pain and increase tissue extensibility to improve the patients ability to improve functional abilities   Min Type Additional Details    [] Estim: []Att   []Unatt        []TENS instruct                  []IFC  []Premod   []NMES                     []Other:  []w/US   []w/ice   []w/heat  Position:  Location:    []  Traction: [] Cervical       []Lumbar                       [] Prone          []Supine                       []Intermittent   []Continuous Lbs:  [] before manual  [] after manual    []  Ultrasound: []Continuous   [] Pulsed                           []1MHz   []3MHz Location:  W/cm2:    []  Iontophoresis with dexamethasone         Location: [] Take home patch   [] In clinic   10 []  Ice     [x]  heat  []  Ice massage Position:supine  Location:lumbar    []  Vasopneumatic Device Pressure:       [] lo [] med [] hi   Temperature: [] lo [] med [] hi   [] Skin assessment post-treatment:  []intact []redness- no adverse reaction       []redness  adverse reaction:       81 min Therapeutic Exercise:  [x] See flow sheet :   Rationale: increase ROM and increase strength to improve the patients ability to improve functional abilities           min Patient Education: [x] Review HEP    [] Progressed/Changed HEP based on:   [] positioning   [] body mechanics   [] transfers   [] heat/ice application        Other Objective/Functional Measures:     Pain Level (0-10 scale) post treatment: 0    ASSESSMENT/Changes in Function: Patient reports approximately 35% overall improvement from therapy since initial evaluation. Pt also presents with decreased frequency/intensity of right distal half of LE radicular symptoms since last tx. Will continue to progress/advance patient within current POC as tolerated with monitoring symptoms. Patient will continue to benefit from skilled PT services to modify and progress therapeutic interventions, address functional mobility deficits, address ROM deficits, address strength deficits, analyze and cue movement patterns, analyze and modify body mechanics/ergonomics and assess and modify postural abnormalities to attain remaining goals.      []  See Plan of Care  []  See progress note/recertification  []  See Discharge Summary         Progress towards goals / Updated goals:      PLAN  [x]  Upgrade activities as tolerated     []  Continue plan of care  []  Update interventions per flow sheet       []  Discharge due to:_  []  Other:_      Melissa Morris, KATIE 6/8/2018  2:35 PM

## 2018-06-13 ENCOUNTER — HOSPITAL ENCOUNTER (OUTPATIENT)
Dept: PHYSICAL THERAPY | Age: 63
Discharge: HOME | End: 2018-06-13
Payer: MEDICARE

## 2018-06-13 PROCEDURE — 97110 THERAPEUTIC EXERCISES: CPT

## 2018-06-13 NOTE — PROGRESS NOTES
PT DAILY TREATMENT NOTE     Patient Name: Cesar Perales  Date:2018  : 1955  [x]  Patient  Verified  Payor: Analisa Lolita / Plan: [address] [place] [address] / Product Type: Managed Care Medicare /    In time:2:30 Out time:3:45  Total Treatment Time (min): 75  Total Timed Codes (min): 60  1:1 Treatment Time (min): 30   Visit #: 7 of 12    Treatment Area: Low back pain [M54.5]    SUBJECTIVE  Pain Level (0-10 scale): .5/10  Any medication changes, allergies to medications, adverse drug reactions, diagnosis change, or new procedure performed?: [x] No    [] Yes (see summary sheet for update)  Subjective functional status/changes:   [] No changes reported  My back and leg have been feeling better since I was here last, so I think that the therapy is slowly starting to help.     OBJECTIVE  Modality rationale: decrease pain and increase tissue extensibility to improve the patients ability to improve functional abilities   Min Type Additional Details    [] Estim: []Att   []Unatt        []TENS instruct                  []IFC  []Premod   []NMES                     []Other:  []w/US   []w/ice   []w/heat  Position:  Location:    []  Traction: [] Cervical       []Lumbar                       [] Prone          []Supine                       []Intermittent   []Continuous Lbs:  [] before manual  [] after manual    []  Ultrasound: []Continuous   [] Pulsed                           []1MHz   []3MHz Location:  W/cm2:    []  Iontophoresis with dexamethasone         Location: [] Take home patch   [] In clinic   10 []  Ice     [x]  heat  []  Ice massage Position:supine  Location:lumbar    []  Vasopneumatic Device Pressure:       [] lo [] med [] hi   Temperature: [] lo [] med [] hi   [] Skin assessment post-treatment:  []intact []redness- no adverse reaction       []redness  adverse reaction:       65 min Therapeutic Exercise:  [x] See flow sheet :   Rationale: increase ROM and increase strength to improve the patients ability to improve functional abilities           min Patient Education: [x] Review HEP    [] Progressed/Changed HEP based on:   [] positioning   [] body mechanics   [] transfers   [] heat/ice application        Other Objective/Functional Measures:     Pain Level (0-10 scale) post treatment: 0    ASSESSMENT/Changes in Function: Pt presents with decreased intensity/frequency of lumbar and right proximal LE radicular symptoms since last tx and is making steady gains with both lumbar and LE flexibility with advancing within current POC. Will review detailed progress/goals for physician update next treatment with continuing to progress/advance within current POC/protocol as tolerated. Patient will continue to benefit from skilled PT services to modify and progress therapeutic interventions, address functional mobility deficits, address ROM deficits, address strength deficits, analyze and cue movement patterns, analyze and modify body mechanics/ergonomics and assess and modify postural abnormalities to attain remaining goals.      []  See Plan of Care  []  See progress note/recertification  []  See Discharge Summary         Progress towards goals / Updated goals:      PLAN  [x]  Upgrade activities as tolerated     []  Continue plan of care  []  Update interventions per flow sheet       []  Discharge due to:_  []  Other:_      Chanelle Bishop PTA 6/13/2018  2:42 PM

## 2018-06-15 ENCOUNTER — HOSPITAL ENCOUNTER (OUTPATIENT)
Dept: PHYSICAL THERAPY | Age: 63
Discharge: HOME | End: 2018-06-15
Payer: MEDICARE

## 2018-06-15 PROCEDURE — G8979 MOBILITY GOAL STATUS: HCPCS

## 2018-06-15 PROCEDURE — 97110 THERAPEUTIC EXERCISES: CPT

## 2018-06-15 PROCEDURE — G8978 MOBILITY CURRENT STATUS: HCPCS

## 2018-06-15 NOTE — PROGRESS NOTES
PT DAILY TREATMENT NOTE     Patient Name: Job Drilling  Date:6/15/2018  : 1955  [x]  Patient  Verified  Payor: [facility] MEDICARE / Plan: [address] [place] [address] / Product Type: Managed Care Medicare /    In time:2:09  Out time:3:35  Total Treatment Time (min): 86  Total Timed Codes (min): 71  1:1 Treatment Time (min):30    Visit #: 8 of 12    Treatment Area: Low back pain [M54.5]    SUBJECTIVE  Pain Level (0-10 scale): 8/10  Any medication changes, allergies to medications, adverse drug reactions, diagnosis change, or new procedure performed?: [x] No    [] Yes (see summary sheet for update)  Subjective functional status/changes:   [] No changes reported  I have still been feeling the pain and tension mostly from my groin area on down my leg and it even goes down to the inside of my ankle at times.     OBJECTIVE  Modality rationale: decrease pain and increase tissue extensibility to improve the patients ability to improve functional abilities   Min Type Additional Details    [] Estim: []Att   []Unatt        []TENS instruct                  []IFC  []Premod   []NMES                     []Other:  []w/US   []w/ice   []w/heat  Position:  Location:    []  Traction: [] Cervical       []Lumbar                       [] Prone          []Supine                       []Intermittent   []Continuous Lbs:  [] before manual  [] after manual    []  Ultrasound: []Continuous   [] Pulsed                           []1MHz   []3MHz Location:  W/cm2:    []  Iontophoresis with dexamethasone         Location: [] Take home patch   [] In clinic   10 []  Ice     [x]  heat  []  Ice massage Position:supine  Location:lumbar    []  Vasopneumatic Device Pressure:       [] lo [] med [] hi   Temperature: [] lo [] med [] hi   [] Skin assessment post-treatment:  []intact []redness- no adverse reaction       []redness  adverse reaction:       76 min Therapeutic Exercise:  [] See flow sheet :   Rationale: increase ROM and increase strength to improve the patients ability to improve functional abilities        min Patient Education: [x] Review HEP    [] Progressed/Changed HEP based on:   [] positioning   [] body mechanics   [] transfers   [] heat/ice application        Other Objective/Functional Measures:     Pain Level (0-10 scale) post treatment: 0    ASSESSMENT/Changes in Function:     Patient will continue to benefit from skilled PT services to modify and progress therapeutic interventions, address functional mobility deficits, address ROM deficits, address strength deficits, analyze and cue movement patterns, analyze and modify body mechanics/ergonomics and assess and modify postural abnormalities to attain remaining goals. []  See Plan of Care  [x]  See progress note/recertification  []  See Discharge Summary         Progress towards goals / Updated goals:  See Progress note/Physician update for full detailed progress towards established goals.     PLAN  [x]  Upgrade activities as tolerated     []  Continue plan of care  []  Update interventions per flow sheet       []  Discharge due to:_  []  Other:_      Angelica Fisher, KATIE 6/15/2018  2:16 PM

## 2018-06-15 NOTE — PROGRESS NOTES
[facility] [place] [address], [place], [address] [place] [address] Road  Phone: [phone]   [phone]  PROGRESS NOTE  Patient Name: Mike Ahuja : 1955   Treatment/Medical Diagnosis: Low back pain [M54.5]   Referral Source: Srinivas Rolon MD     Date of Initial Visit: 18 Attended Visits: 8 Missed Visits: 0     SUMMARY OF TREATMENT  Therapeutic exercise for lumbar/LE flexibility, postural awareness/strengthening, lumbopelvic/core stabilization, moist heat and HEP. CURRENT STATUS  Patient reports approximately 35% overall improvement from therapy since initial evaluation with 6/10 pain level on average, increased to 8/10 at the worst in the AM upon rising before activity. Pt has been making steady progress with gaining lumbar and LE flexibility as well as advancing with level 2 lumbar stabilization program within current POC over the past few visits. Pt although still presents with inconsistent relief of right LE radicular symptoms since initial evaluation. Pt would benefit from continued therapy for 10 additional visits to achieve maximum medical benefit/potential from current POC. Will continue to progress/advance patient within current POC as tolerated with monitoring symptoms. Lumbar AROM= Flexion=80%; Extension=70%; Side bending=75% bilaterally   Goal/Measure of Progress Goal Met? 1. Patient to report centralization of radicular symptoms to low back in order to reduce patient pain. Status at last Eval: Progressing Current Status: Not Met no   2. Improve FOTO outcome score by 9% to indicate a significant improvement in ADL function   Status at last Eval: 53/100 Current Status: 56/100 no   3. Pt will report  25-50% improvement with prolonged standing and walking ADL's   Status at last Eval: Progressing  Current Status: 65% improvement reported yes   4.   Pt will report 50% improvement with lower extremity flexibility to reduce right radicular symptoms. Status at last Eval: Progressing  Current Status: 60% improvement reported yes     New Goals to be achieved in __10__  treatments:  1. Patient to report centralization of radicular symptoms to low back in order to reduce patient pain. 2.  Improve FOTO outcome score by 9% to indicate a significant improvement in ADL function   3. Pt will report  75% improvement with prolonged standing and walking ADL's   4. Pt will report 75% improvement with lower extremity flexibility to reduce right radicular symptoms. G-codes=Mobility [id number] Current  CK= 40-59%   Goal  CJ= 20-39%. The severity rating is based on the FOTO Score  RECOMMENDATIONS  Continue with current POC for 10 additional visits with advancing as tolerated, then reassess for discharge from therapy as planned/discussed. If you have any questions/comments please contact us directly at [phone]. Thank you for allowing us to assist in the care of your patient. LPTA Signature: Dahiana Still PTA  Date: 6/15/2018   PT Signature:  Time: 2:24 PM   NOTE TO PHYSICIAN:  PLEASE COMPLETE THE ORDERS BELOW AND FAX TO   [facility] at [facility] [place] UNIT: [phone]. If you are unable to process this request in 24 hours please contact our office: [phone].    ___ I have read the above report and request that my patient continue as recommended.   ___ I have read the above report and request that my patient continue therapy with the following changes/special instructions:_________________________________________________________   ___ I have read the above report and request that my patient be discharged from therapy.      Physician Signature:        Date:       Time:

## 2018-06-20 ENCOUNTER — HOSPITAL ENCOUNTER (OUTPATIENT)
Dept: PHYSICAL THERAPY | Age: 63
Discharge: HOME | End: 2018-06-20
Payer: MEDICARE

## 2018-06-20 PROCEDURE — 97110 THERAPEUTIC EXERCISES: CPT

## 2018-06-20 NOTE — PROGRESS NOTES
PT DAILY TREATMENT NOTE 8-    Patient Name: Narendra Valdovinos  Date:2018  : 1955  [x]  Patient  Verified  Payor: Gwyn Gabriel / Plan: [address] [place] [address] / Product Type: Managed Care Medicare /    In time:2:26  Out time:3:49  Total Treatment Time (min): 83  Total Timed Codes (min): 67  1:1 Treatment Time (min): 67   Visit #: 9 of 18    Treatment Area: Low back pain [M54.5]    SUBJECTIVE  Pain Level (0-10 scale): 0  Any medication changes, allergies to medications, adverse drug reactions, diagnosis change, or new procedure performed?: [x] No    [] Yes (see summary sheet for update)  Subjective functional status/changes:   [] No changes reported  My back feels pretty good today because I took my pain medicine and laid on the heating pad for a little while before I came here.     OBJECTIVE  Modality rationale: decrease pain and increase tissue extensibility to improve the patients ability to improve functional abilities   Min Type Additional Details    [] Estim: []Att   []Unatt        []TENS instruct                  []IFC  []Premod   []NMES                     []Other:  []w/US   []w/ice   []w/heat  Position:  Location:    []  Traction: [] Cervical       []Lumbar                       [] Prone          []Supine                       []Intermittent   []Continuous Lbs:  [] before manual  [] after manual    []  Ultrasound: []Continuous   [] Pulsed                           []1MHz   []3MHz Location:  W/cm2:    []  Iontophoresis with dexamethasone         Location: [] Take home patch   [] In clinic   10 []  Ice     [x]  heat  []  Ice massage Position:supine  Location:lumbar    []  Vasopneumatic Device Pressure:       [] lo [] med [] hi   Temperature: [] lo [] med [] hi   [] Skin assessment post-treatment:  []intact []redness- no adverse reaction       []redness  adverse reaction:       73 min Therapeutic Exercise:  [x] See flow sheet :   Rationale: increase ROM and increase strength to improve the patients ability to improve functional abilities           min Patient Education: [x] Review HEP    [] Progressed/Changed HEP based on:   [] positioning   [] body mechanics   [] transfers   [] heat/ice application        Other Objective/Functional Measures:     Pain Level (0-10 scale) post treatment: 0    ASSESSMENT/Changes in Function: Pt reports minimal to no lumbar symptoms at beginning to treatment for the first time since initial evaluation with combination of pre medicating and moist heat before treatment today. Pt was also able to advance to stability ball bridges with min to mod challenge today as well. Will continue to progress/advance patient within current POC as tolerated with monitoring symptoms. Patient will continue to benefit from skilled PT services to modify and progress therapeutic interventions, address functional mobility deficits, address ROM deficits, address strength deficits, analyze and cue movement patterns, analyze and modify body mechanics/ergonomics and assess and modify postural abnormalities to attain remaining goals.      []  See Plan of Care  []  See progress note/recertification  []  See Discharge Summary         Progress towards goals / Updated goals:      PLAN  [x]  Upgrade activities as tolerated     []  Continue plan of care  []  Update interventions per flow sheet       []  Discharge due to:_  []  Other:_      Marta Kelly PTA 6/20/2018  2:31 PM

## 2018-06-22 ENCOUNTER — HOSPITAL ENCOUNTER (OUTPATIENT)
Dept: PHYSICAL THERAPY | Age: 63
Discharge: HOME | End: 2018-06-22
Payer: MEDICARE

## 2018-06-22 PROCEDURE — 97110 THERAPEUTIC EXERCISES: CPT

## 2018-06-22 NOTE — PROGRESS NOTES
PT DAILY TREATMENT NOTE     Patient Name: Claudia Matos  Date:2018  : 1955  [x]  Patient  Verified  Payor: [facility] MEDICARE / Plan: [address] [place] [address] / Product Type: Managed Care Medicare /    In time:2:09  Out time:3:27  Total Treatment Time (min): 78  Total Timed Codes (min): 62  1:1 Treatment Time (min):    Visit #: 10 of 18    Treatment Area: Low back pain [M54.5]    SUBJECTIVE  Pain Level (0-10 scale): 6/10  Any medication changes, allergies to medications, adverse drug reactions, diagnosis change, or new procedure performed?: [x] No    [] Yes (see summary sheet for update)  Subjective functional status/changes:   [] No changes reported  I feel most of the pain in the inside of my right calf going down to the inside of my foot today, but my lower back actually feels pretty good because I used my heating pad right before I came in here today.     OBJECTIVE  Modality rationale: decrease pain and increase tissue extensibility to improve the patients ability to improve functional abilities   Min Type Additional Details    [] Estim: []Att   []Unatt        []TENS instruct                  []IFC  []Premod   []NMES                     []Other:  []w/US   []w/ice   []w/heat  Position:  Location:    []  Traction: [] Cervical       []Lumbar                       [] Prone          []Supine                       []Intermittent   []Continuous Lbs:  [] before manual  [] after manual    []  Ultrasound: []Continuous   [] Pulsed                           []1MHz   []3MHz Location:  W/cm2:    []  Iontophoresis with dexamethasone         Location: [] Take home patch   [] In clinic   10 []  Ice     [x]  heat  []  Ice massage Position:supine  Location:lumbar    []  Vasopneumatic Device Pressure:       [] lo [] med [] hi   Temperature: [] lo [] med [] hi   [] Skin assessment post-treatment:  []intact []redness- no adverse reaction       []redness  adverse reaction:       68 min Therapeutic Exercise:  [x] See flow sheet :   Rationale: increase ROM and increase strength to improve the patients ability to improve functional abilities           min Patient Education: [x] Review HEP    [] Progressed/Changed HEP based on:   [] positioning   [] body mechanics   [] transfers   [] heat/ice application        Other Objective/Functional Measures:     Pain Level (0-10 scale) post treatment: 6/10    ASSESSMENT/Changes in Function: Pt presented with chief c/o isolated distal/medial calf pain down to medial foot today. Pt however demonstrates visibly improved lumbar and LE mobility/flexibility with therex and self stretching exercises over the past few treatments. Will continue to progress/advance patient within current POC as tolerated with monitoring symptoms. Patient will continue to benefit from skilled PT services to modify and progress therapeutic interventions, address functional mobility deficits, address ROM deficits, address strength deficits, analyze and cue movement patterns, analyze and modify body mechanics/ergonomics and assess and modify postural abnormalities to attain remaining goals.      []  See Plan of Care  []  See progress note/recertification  []  See Discharge Summary         Progress towards goals / Updated goals:      PLAN  [x]  Upgrade activities as tolerated     []  Continue plan of care  []  Update interventions per flow sheet       []  Discharge due to:_  []  Other:_      Misti Banks PTA 6/22/2018  2:11 PM

## 2018-06-27 ENCOUNTER — HOSPITAL ENCOUNTER (OUTPATIENT)
Dept: PHYSICAL THERAPY | Age: 63
Discharge: HOME | End: 2018-06-27
Payer: MEDICARE

## 2018-06-27 PROCEDURE — 97110 THERAPEUTIC EXERCISES: CPT

## 2018-06-27 NOTE — PROGRESS NOTES
PT DAILY TREATMENT NOTE     Patient Name: Jeffery Guido  Date:2018  : 1955  [x]  Patient  Verified  Payor: [facility] MEDICARE / Plan: [address] [place] [address] / Product Type: Managed Care Medicare /    In time:2:22  Out time:3:34  Total Treatment Time (min): 72  Total Timed Codes (min): 56  1:1 Treatment Time (min): 38  Visit #: 11 of 18    Treatment Area: Low back pain [M54.5]    SUBJECTIVE  Pain Level (0-10 scale): 7/10  Any medication changes, allergies to medications, adverse drug reactions, diagnosis change, or new procedure performed?: [x] No    [] Yes (see summary sheet for update)  Subjective functional status/changes:   [] No changes reported  My back feels okay today, my biggest problem is the pain and numbness the starts at my buttock and goes down my leg on and off for no rhyme or reason lately.      OBJECTIVE  Modality rationale: decrease pain and increase tissue extensibility to improve the patients ability to improve functional abilities   Min Type Additional Details    [] Estim: []Att   []Unatt        []TENS instruct                  []IFC  []Premod   []NMES                     []Other:  []w/US   []w/ice   []w/heat  Position:  Location:    []  Traction: [] Cervical       []Lumbar                       [] Prone          []Supine                       []Intermittent   []Continuous Lbs:  [] before manual  [] after manual    []  Ultrasound: []Continuous   [] Pulsed                           []1MHz   []3MHz Location:  W/cm2:    []  Iontophoresis with dexamethasone         Location: [] Take home patch   [] In clinic   10 []  Ice     [x]  heat  []  Ice massage Position:supine  Location:lumbar    []  Vasopneumatic Device Pressure:       [] lo [] med [] hi   Temperature: [] lo [] med [] hi   [] Skin assessment post-treatment:  []intact []redness- no adverse reaction       []redness  adverse reaction:       62 min Therapeutic Exercise:  [x] See flow sheet :   Rationale: increase ROM and increase strength to improve the patients ability to improve functional abilities           min Patient Education: [x] Review HEP    [] Progressed/Changed HEP based on:   [] positioning   [] body mechanics   [] transfers   [] heat/ice application        Other Objective/Functional Measures:     Pain Level (0-10 scale) post treatment: 0    ASSESSMENT/Changes in Function: Pt presents with continued inconsistent relief of right LE radicular symptoms over the past few treatments despite slow but steady improvement with lumbar and LE flexibility with current POC. Will continue to progress/advance patient within current POC as tolerated with monitoring symptoms. Patient will continue to benefit from skilled PT services to modify and progress therapeutic interventions, address functional mobility deficits, address ROM deficits, address strength deficits, analyze and cue movement patterns, analyze and modify body mechanics/ergonomics and assess and modify postural abnormalities to attain remaining goals.      []  See Plan of Care  []  See progress note/recertification  []  See Discharge Summary         Progress towards goals / Updated goals:  Goal #4=Pt will report 75% improvement with lower extremity flexibility to reduce right radicular symptoms:Progressing, Pt reports approximately 50% improvement with lower extremity flexibility to reduce right radicular symptoms    PLAN  [x]  Upgrade activities as tolerated     []  Continue plan of care  []  Update interventions per flow sheet       []  Discharge due to:_  []  Other:_      Ming Chavez, KATIE 6/27/2018  2:29 PM

## 2018-06-29 ENCOUNTER — HOSPITAL ENCOUNTER (OUTPATIENT)
Dept: PHYSICAL THERAPY | Age: 63
Discharge: HOME | End: 2018-06-29
Payer: MEDICARE

## 2018-06-29 PROCEDURE — 97110 THERAPEUTIC EXERCISES: CPT

## 2018-06-29 NOTE — PROGRESS NOTES
PT DAILY TREATMENT NOTE     Patient Name: Malcolm Flores  Date:2018  : 1955  [x]  Patient  Verified  Payor: [facility] MEDICARE / Plan: [address] [place] [address] / Product Type: Managed Care Medicare /    In time:2:32  Out time:3:53  Total Treatment Time (min): 81  Total Timed Codes (min): 65  1:1 Treatment Time (min):    Visit #: 12 of 18    Treatment Area: Low back pain [M54.5]    SUBJECTIVE  Pain Level (0-10 scale): 7/10  Any medication changes, allergies to medications, adverse drug reactions, diagnosis change, or new procedure performed?: [x] No    [] Yes (see summary sheet for update)  Subjective functional status/changes:   [] No changes reported  My lower back has been sore and achy ever since I woke up this morning, but I still feel the pain in my ankle like I did the last time I was here.     OBJECTIVE  Modality rationale: decrease pain and increase tissue extensibility to improve the patients ability to improve functional abilities   Min Type Additional Details    [] Estim: []Att   []Unatt        []TENS instruct                  []IFC  []Premod   []NMES                     []Other:  []w/US   []w/ice   []w/heat  Position:  Location:    []  Traction: [] Cervical       []Lumbar                       [] Prone          []Supine                       []Intermittent   []Continuous Lbs:  [] before manual  [] after manual    []  Ultrasound: []Continuous   [] Pulsed                           []1MHz   []3MHz Location:  W/cm2:    []  Iontophoresis with dexamethasone         Location: [] Take home patch   [] In clinic   10 []  Ice     [x]  heat  []  Ice massage Position:supine  Location:lumbar    []  Vasopneumatic Device Pressure:       [] lo [] med [] hi   Temperature: [] lo [] med [] hi   [] Skin assessment post-treatment:  []intact []redness- no adverse reaction       []redness  adverse reaction:       71 min Therapeutic Exercise:  [x] See flow sheet :   Rationale: increase ROM and increase strength to improve the patients ability to improve functional abilities            min Patient Education: [x] Review HEP    [] Progressed/Changed HEP based on:   [] positioning   [] body mechanics   [] transfers   [] heat/ice application        Other Objective/Functional Measures:     Pain Level (0-10 scale) post treatment: 0    ASSESSMENT/Changes in Function: Pt presented with chief c/o increased bilateral lumbar quadrant and gluteal soreness/aching since this AM but was able to tolerate full normal therex regiment including addition of step quadratus stretches with min to mod challenge today. Will continue to progress/advance patient within current POC as tolerated with monitoring symptoms. Patient will continue to benefit from skilled PT services to modify and progress therapeutic interventions, address functional mobility deficits, address ROM deficits, address strength deficits, analyze and cue movement patterns, analyze and modify body mechanics/ergonomics and assess and modify postural abnormalities to attain remaining goals.      []  See Plan of Care  []  See progress note/recertification  []  See Discharge Summary         Progress towards goals / Updated goals:      PLAN  [x]  Upgrade activities as tolerated     []  Continue plan of care  []  Update interventions per flow sheet       []  Discharge due to:_  []  Other:_      Mikey Crawley PTA 6/29/2018  2:30 PM

## 2018-07-06 ENCOUNTER — HOSPITAL ENCOUNTER (OUTPATIENT)
Dept: PHYSICAL THERAPY | Age: 63
Discharge: HOME | End: 2018-07-06
Payer: MEDICARE

## 2018-07-06 PROCEDURE — 97110 THERAPEUTIC EXERCISES: CPT

## 2018-07-06 NOTE — PROGRESS NOTES
PT DAILY TREATMENT NOTE     Patient Name: Licha Packer  Date:2018  : 1955  [x]  Patient  Verified  Payor: [facility] MEDICARE / Plan: [address] [place] [address] / Product Type: Managed Care Medicare /    In time:1:58  Out time:3:13  Total Treatment Time (min): 75  Total Timed Codes (min): 59  1:1 Treatment Time (min): 35   Visit #: 13 of 18    Treatment Area: Low back pain [M54.5]    SUBJECTIVE  Pain Level (0-10 scale): 7/10  Any medication changes, allergies to medications, adverse drug reactions, diagnosis change, or new procedure performed?: [x] No    [] Yes (see summary sheet for update)  Subjective functional status/changes:   [] No changes reported  My back has been feeling bout the same as usually lately, but I have been getting more pain down into my right wrist and thumb lately, so I don't know what is going on.     OBJECTIVE  Modality rationale: decrease pain and increase tissue extensibility to improve the patients ability to improve functional abilities   Min Type Additional Details    [] Estim: []Att   []Unatt        []TENS instruct                  []IFC  []Premod   []NMES                     []Other:  []w/US   []w/ice   []w/heat  Position:  Location:    []  Traction: [] Cervical       []Lumbar                       [] Prone          []Supine                       []Intermittent   []Continuous Lbs:  [] before manual  [] after manual    []  Ultrasound: []Continuous   [] Pulsed                           []1MHz   []3MHz Location:  W/cm2:    []  Iontophoresis with dexamethasone         Location: [] Take home patch   [] In clinic   10 []  Ice     [x]  heat  []  Ice massage Position:supine  Location:lumbar    []  Vasopneumatic Device Pressure:       [] lo [] med [] hi   Temperature: [] lo [] med [] hi   [] Skin assessment post-treatment:  []intact []redness- no adverse reaction       []redness  adverse reaction:       65 min Therapeutic Exercise:  [x] See flow sheet :   Rationale: increase ROM and increase strength to improve the patients ability to improve functional abilities           min Patient Education: [x] Review HEP    [] Progressed/Changed HEP based on:   [] positioning   [] body mechanics   [] transfers   [] heat/ice application        Other Objective/Functional Measures:     Pain Level (0-10 scale) post treatment: 0    ASSESSMENT/Changes in Function: Pt presented with minimal change/increase in lumbar and right LE radicular symptoms since last treatment except for new c/o unrelated right wrist and thumb pain. Will continue to progress/advance patient within current POC as tolerated with monitoring symptoms. Patient will continue to benefit from skilled PT services to modify and progress therapeutic interventions, address functional mobility deficits, address ROM deficits, address strength deficits, analyze and cue movement patterns, analyze and modify body mechanics/ergonomics and assess and modify postural abnormalities to attain remaining goals.      []  See Plan of Care  []  See progress note/recertification  []  See Discharge Summary         Progress towards goals / Updated goals:  Pt will report  75% improvement with prolonged standing and walking ADL's:Not Met, but progressing, Pt reports approximately 60% improvement with tolerating prolonged standing and walking ADL's since initial evaluation    PLAN  [x]  Upgrade activities as tolerated     []  Continue plan of care  []  Update interventions per flow sheet       []  Discharge due to:_  []  Other:_      Collin Khan PTA 7/6/2018  1:53 PM

## 2018-07-11 ENCOUNTER — HOSPITAL ENCOUNTER (OUTPATIENT)
Dept: PHYSICAL THERAPY | Age: 63
Discharge: HOME | End: 2018-07-11
Payer: MEDICARE

## 2018-07-11 PROCEDURE — 97110 THERAPEUTIC EXERCISES: CPT

## 2018-07-11 NOTE — PROGRESS NOTES
PT DAILY TREATMENT NOTE 8-    Patient Name: Alida Green  Date:2018  : 1955  [x]  Patient  Verified  Payor: [employer] / Plan: [address] [place] [address] / Product Type: Managed Care Medicare /    In time: 1:27  Out time: 2:39  Total Treatment Time (min): 72  Total Timed Codes (min): 62  1:1 Treatment Time (min): 31   Visit #: 14 of 18    Treatment Area: Low back pain [M54.5]    SUBJECTIVE  Pain Level (0-10 scale): 6-7/10 with radicular sx to HS on left LE and to bottom of foot on right LE  Any medication changes, allergies to medications, adverse drug reactions, diagnosis change, or new procedure performed?: [x] No    [] Yes (see summary sheet for update)  Subjective functional status/changes:   [x] No changes reported  I never knew it was a pinched nerve until the doctor told me.      OBJECTIVE  Modality rationale: decrease pain and increase tissue extensibility to improve the patients ability to improve functional abilities   Min Type Additional Details    [] Estim: []Att   []Unatt        []TENS instruct                  []IFC  []Premod   []NMES                     []Other:  []w/US   []w/ice   []w/heat  Position:  Location:    []  Traction: [] Cervical       []Lumbar                       [] Prone          []Supine                       []Intermittent   []Continuous Lbs:  [] before manual  [] after manual    []  Ultrasound: []Continuous   [] Pulsed                           []1MHz   []3MHz Location:  W/cm2:    []  Iontophoresis with dexamethasone         Location: [] Take home patch   [] In clinic   10 []  Ice     [x]  heat  []  Ice massage Position:supine  Location:lumbar    []  Vasopneumatic Device Pressure:       [] lo [] med [] hi   Temperature: [] lo [] med [] hi   [] Skin assessment post-treatment:  []intact []redness- no adverse reaction       []redness  adverse reaction:       62 (31) min Therapeutic Exercise:  [x] See flow sheet :   Rationale: increase ROM and increase strength to improve the patients ability to improve functional abilities           min Patient Education: [x] Review HEP    [] Progressed/Changed HEP based on:   [] positioning   [] body mechanics   [] transfers   [] heat/ice application        Other Objective/Functional Measures:   - Progressed reps/sets/resistance as noted on flow sheet  - Tactile cuing for proper PPT    Pain Level (0-10 scale) post treatment: 5-6/10    ASSESSMENT/Changes in Function:   Pt presented with minimal change/increase in lumbar and right LE radicular symptoms since last treatment. Will continue to progress/advance patient within current POC as tolerated with monitoring symptoms. Patient will continue to benefit from skilled PT services to modify and progress therapeutic interventions, address functional mobility deficits, address ROM deficits, address strength deficits, analyze and cue movement patterns, analyze and modify body mechanics/ergonomics and assess and modify postural abnormalities to attain remaining goals. []  See Plan of Care  []  See progress note/recertification  []  See Discharge Summary         Progress towards goals / Updated goals:   1. Patient to report centralization of radicular symptoms to low back in order to reduce patient pain. Current: NOT MET, pt reports radicular sx in bilaterally LEs, right>left. 7/11/18   2. Improve FOTO outcome score by 9% to indicate a significant improvement in ADL function   3. Pt will report  75% improvement with prolonged standing and walking ADL's  Current: Not Met, but progressing, Pt reports approximately 60% improvement with tolerating prolonged standing and walking ADL's since initial evaluation. 7/6/18   4. Pt will report 75% improvement with lower extremity flexibility to reduce right radicular symptoms.      PLAN  [x]  Upgrade activities as tolerated     []  Continue plan of care  []  Update interventions per flow sheet       []  Discharge due to:_  [x]  Other:_Patient is due for PN next visit.      INDER Daugherty 7/11/2018  3:00 PM

## 2018-07-13 ENCOUNTER — HOSPITAL ENCOUNTER (OUTPATIENT)
Dept: PHYSICAL THERAPY | Age: 63
Discharge: HOME | End: 2018-07-13
Payer: MEDICARE

## 2018-07-13 PROCEDURE — 97110 THERAPEUTIC EXERCISES: CPT

## 2018-07-13 PROCEDURE — G8978 MOBILITY CURRENT STATUS: HCPCS

## 2018-07-13 PROCEDURE — G8979 MOBILITY GOAL STATUS: HCPCS

## 2018-07-13 NOTE — PROGRESS NOTES
[facility] [place] [address], [place], [address] [place] [address] Road  Phone: [phone]   [phone]  PROGRESS NOTE  Patient Name: Mil Ceballos : 1955   Treatment/Medical Diagnosis: Low back pain [M54.5]   Referral Source: Nelda Caldwell MD     Date of Initial Visit: 18 Attended Visits: 15 Missed Visits: 0 CXL  0 NS's     SUMMARY OF TREATMENT  Pt was seen on  for an initial evaluation for low back pain. Pt has been seen for 15 visits and therapy has included: therapeutic exercise for lumbar/LE flexibility, postural awareness/strengthening, lumbopelvic/core stabilization, moist heat and HEP. CURRENT STATUS  Pt has been making limited progress in therapy. Pt reports 35% overall improvement with functional ADL's since beginning PT. Pt's pain range is 0-9/10. He reports when his pain is a 0 it is because of the pain meds. Patient reports the following functional improvements in therapy: no change. Patient continues to need work on: decreasing pain to allow for functional ADLs. Pt has progressed towards the following goals listed below:    Goal/Measure of Progress Goal Met? 1. Patient to report centralization of radicular symptoms to low back in order to reduce patient pain. Status at last Eval: Progressing Current Status: Pt reports radicular sx in BLEs to feet no   2. Improve FOTO outcome score by 9% to indicate a significant improvement in ADL function   Status at last Eval: 56/100 Current Status: 40/100 no   3. Pt will report  75% improvement with prolonged standing and walking ADL's   Status at last Eval: 65% improvement reported Current Status: No change no   4. Pt will report 75% improvement with lower extremity flexibility to reduce right radicular symptoms.    Status at last Eval: 60% improvement reported Current Status: No change no     Goals to remain the same, to be achieved in __3__  treatments:    G-Codes (GP): Mobility [id number] Current  CL= 60-79%   Goal  CJ= 20-39%. The severity rating is based on the FOTO Score  RECOMMENDATIONS  Continue therapy to complete 3 remaining treatments, then discharge at that time. If you have any questions/comments please contact us directly at [phone]. Thank you for allowing us to assist in the care of your patient. Therapist Signature: INDER Strickland Date: 7/13/2018   Reporting Period 5/18/18-8/17/18 Time: 4:20 PM   NOTE TO PHYSICIAN:  PLEASE COMPLETE THE ORDERS BELOW AND FAX TO   [facility]: [phone]. If you are unable to process this request in 24 hours please contact our office: [phone].    ___ I have read the above report and request that my patient continue as recommended.   ___ I have read the above report and request that my patient continue therapy with the following changes/special instructions:_________________________________________________________   ___ I have read the above report and request that my patient be discharged from therapy.      Physician Signature:        Date:       Time:

## 2018-07-13 NOTE — PROGRESS NOTES
PT DAILY TREATMENT NOTE     Patient Name: Alida Green  Date:2018  : 1955  [x]  Patient  Verified  Payor: [facility] MEDICARE / Plan: [address] [place] [address] / Product Type: Managed Care Medicare /    In time: 2:30  Out time: 3:34  Total Treatment Time (min): 64  Total Timed Codes (min): 54  1:1 Treatment Time (min): 39  Visit #: 15 of 18    Treatment Area: Low back pain [M54.5]    SUBJECTIVE  Pain Level (0-10 scale): 6-7/10 with radicular sx in right LE to foot  Any medication changes, allergies to medications, adverse drug reactions, diagnosis change, or new procedure performed?: [x] No    [] Yes (see summary sheet for update)  Subjective functional status/changes:   [x] No changes reported  I really can't give you numbers. Sometimes it goes away but I blame it on the gabapentin, then it comes back again. It's like a creepy sensation in my foot and comes up my leg.      OBJECTIVE  Modality rationale: decrease pain and increase tissue extensibility to improve the patients ability to improve functional abilities   Min Type Additional Details    [] Estim: []Att   []Unatt        []TENS instruct                  []IFC  []Premod   []NMES                     []Other:  []w/US   []w/ice   []w/heat  Position:  Location:    []  Traction: [] Cervical       []Lumbar                       [] Prone          []Supine                       []Intermittent   []Continuous Lbs:  [] before manual  [] after manual    []  Ultrasound: []Continuous   [] Pulsed                           []1MHz   []3MHz Location:  W/cm2:    []  Iontophoresis with dexamethasone         Location: [] Take home patch   [] In clinic   10 []  Ice     [x]  heat  []  Ice massage Position:supine  Location:lumbar    []  Vasopneumatic Device Pressure:       [] lo [] med [] hi   Temperature: [] lo [] med [] hi   [] Skin assessment post-treatment:  []intact []redness- no adverse reaction       []redness  adverse reaction:       54 (39) min Therapeutic Exercise:  [x] See flow sheet :   Rationale: increase ROM and increase strength to improve the patients ability to improve functional abilities           min Patient Education: [x] Review HEP    [] Progressed/Changed HEP based on:   [] positioning   [] body mechanics   [] transfers   [] heat/ice application        Other Objective/Functional Measures:   - Progressed reps/sets/resistance as noted on flow sheet  - Tactile cuing for proper PPT  - Trialed sciatic nerve glides and prone progression. Pain Level (0-10 scale) post treatment: 1-2/10    ASSESSMENT/Changes in Function: SEE PN    Patient will continue to benefit from skilled PT services to modify and progress therapeutic interventions, address functional mobility deficits, address ROM deficits, address strength deficits, analyze and cue movement patterns, analyze and modify body mechanics/ergonomics and assess and modify postural abnormalities to attain remaining goals. []  See Plan of Care   [x]  See progress note/recertification  []  See Discharge Summary         Progress towards goals / Updated goals:   1. Patient to report centralization of radicular symptoms to low back in order to reduce patient pain. Current: NOT MET, pt reports radicular sx in bilaterally LEs, right>left. 7/11/18   2. Improve FOTO outcome score by 9% to indicate a significant improvement in ADL function  Current:40/100, NOT MET 7/13/18   3. Pt will report  75% improvement with prolonged standing and walking ADL's  Current: Not Met, but progressing, Pt reports approximately 60% improvement with tolerating prolonged standing and walking ADL's since initial evaluation. NOT MET, 7/6/18   4. Pt will report 75% improvement with lower extremity flexibility to reduce right radicular symptoms. Current: Pt reports sx are less intense but cannot give a number.  NOT MET, 7/13/18      PLAN  [x]  Upgrade activities as tolerated     []  Continue plan of care  []  Update interventions per flow sheet       []  Discharge due to:_  [x]  Other:_Patient wishes to complete remaining 3 treatments and discharge to [facility] at that time.      INDER Vicente 7/13/2018  4:20 PM

## 2018-07-18 ENCOUNTER — HOSPITAL ENCOUNTER (OUTPATIENT)
Dept: PHYSICAL THERAPY | Age: 63
Discharge: HOME | End: 2018-07-18
Payer: MEDICARE

## 2018-07-18 PROCEDURE — 97140 MANUAL THERAPY 1/> REGIONS: CPT

## 2018-07-18 PROCEDURE — 97110 THERAPEUTIC EXERCISES: CPT

## 2018-07-18 NOTE — PROGRESS NOTES
PT DAILY TREATMENT NOTE     Patient Name: Vikki Rouse  Date:2018  : 1955  [x]  Patient  Verified  Payor: [facility] MEDICARE / Plan: [address] [place] [address] / Product Type: Managed Care Medicare /    In time: 1:26  Out time: 2:44  Total Treatment Time (min): 78  Total Timed Codes (min): 68  1:1 Treatment Time (min): 46  Visit #: 16 of 18    Treatment Area: Low back pain [M54.5]    SUBJECTIVE  Pain Level (0-10 scale): 8/10 with radicular sx in right posterior LE to foot  Any medication changes, allergies to medications, adverse drug reactions, diagnosis change, or new procedure performed?: [x] No    [] Yes (see summary sheet for update)  Subjective functional status/changes:   [] No changes reported  I only feel it in the right leg. I've been trying all morning to get rid of the pain.      OBJECTIVE  Modality rationale: decrease pain and increase tissue extensibility to improve the patients ability to improve functional abilities   Min Type Additional Details    [] Estim: []Att   []Unatt        []TENS instruct                  []IFC  []Premod   []NMES                     []Other:  []w/US   []w/ice   []w/heat  Position:  Location:    []  Traction: [] Cervical       []Lumbar                       [] Prone          []Supine                       []Intermittent   []Continuous Lbs:  [] before manual  [] after manual    []  Ultrasound: []Continuous   [] Pulsed                           []1MHz   []3MHz Location:  W/cm2:    []  Iontophoresis with dexamethasone         Location: [] Take home patch   [] In clinic   10 []  Ice     [x]  heat  []  Ice massage Position:supine  Location:lumbar    []  Vasopneumatic Device Pressure:       [] lo [] med [] hi   Temperature: [] lo [] med [] hi   [] Skin assessment post-treatment:  []intact []redness- no adverse reaction       []redness  adverse reaction:       60 (38) min Therapeutic Exercise:  [x] See flow sheet :   Rationale: increase ROM and increase strength to improve the patients ability to improve functional abilities       (8) min Manual Therapy:  []  See flow sheet : DTM to right gluts, right piriformis release   Rationale: increase ROM and increase tissue extensibility and decrease pain  to improve the patients ability to perform functional ADLs           min Patient Education: [x] Review HEP    [] Progressed/Changed HEP based on:   [] positioning   [] body mechanics   [] transfers   [] heat/ice application        Other Objective/Functional Measures:    - Added DTKC    Pain Level (0-10 scale) post treatment: 0/10    ASSESSMENT/Changes in Function:   Pt presents with increased pain level and sx in right LE today. Patient reports a decrease in pain level to 6.5 after MT and SKTC/DKTC, however still reports radicular sx to right foot. Patient will continue to benefit from skilled PT services to modify and progress therapeutic interventions, address functional mobility deficits, address ROM deficits, address strength deficits, analyze and cue movement patterns, analyze and modify body mechanics/ergonomics and assess and modify postural abnormalities to attain remaining goals. []  See Plan of Care   []  See progress note/recertification  []  See Discharge Summary         Progress towards goals / Updated goals: NO CHANGE 7/18/18  1. Patient to report centralization of radicular symptoms to low back in order to reduce patient pain. Current: NOT MET, pt reports radicular sx in bilaterally LEs, right>left. 7/11/18   2. Improve FOTO outcome score by 9% to indicate a significant improvement in ADL function  Current:40/100, NOT MET 7/13/18   3. Pt will report  75% improvement with prolonged standing and walking ADL's  Current: Pt reports approximately 60% improvement with tolerating prolonged standing and walking ADL's since initial evaluation. NOT MET, 7/6/18   4.   Pt will report 75% improvement with lower extremity flexibility to reduce right radicular symptoms. Current: Pt reports sx are less intense but cannot give a number.  NOT MET, 7/13/18      PLAN  [x]  Upgrade activities as tolerated     [x]  Continue plan of care  []  Update interventions per flow sheet       []  Discharge due to:_  []  Other:_     INDER Segovia 7/18/2018  3:36 PM

## 2018-07-20 ENCOUNTER — HOSPITAL ENCOUNTER (OUTPATIENT)
Dept: PHYSICAL THERAPY | Age: 63
Discharge: HOME | End: 2018-07-20
Payer: MEDICARE

## 2018-07-20 PROCEDURE — 97110 THERAPEUTIC EXERCISES: CPT

## 2018-07-20 NOTE — PROGRESS NOTES
PT DAILY TREATMENT NOTE     Patient Name: Graham Gannon  Date:2018  : 1955  [x]  Patient  Verified  Payor: [facility] MEDICARE / Plan: [address] [place] [address] / Product Type: Managed Care Medicare /    In time: 1:30  Out time: 2:42  Total Treatment Time (min): 72  Total Timed Codes (min): 62  1:1 Treatment Time (min): 44  Visit #: 17 of 18    Treatment Area: Low back pain [M54.5]    SUBJECTIVE  Pain Level (0-10 scale): 7/10 with radicular sx in right posterior LE to foot  Any medication changes, allergies to medications, adverse drug reactions, diagnosis change, or new procedure performed?: [x] No    [] Yes (see summary sheet for update)  Subjective functional status/changes:   [] No changes reported  I felt a little better after last visit. I woke up and didn't feel it for a while. I can't say how long it lasted.      OBJECTIVE  Modality rationale: decrease pain and increase tissue extensibility to improve the patients ability to improve functional abilities   Min Type Additional Details    [] Estim: []Att   []Unatt        []TENS instruct                  []IFC  []Premod   []NMES                     []Other:  []w/US   []w/ice   []w/heat  Position:  Location:    []  Traction: [] Cervical       []Lumbar                       [] Prone          []Supine                       []Intermittent   []Continuous Lbs:  [] before manual  [] after manual    []  Ultrasound: []Continuous   [] Pulsed                           []1MHz   []3MHz Location:  W/cm2:    []  Iontophoresis with dexamethasone         Location: [] Take home patch   [] In clinic   10 []  Ice     [x]  heat  []  Ice massage Position:supine  Location:lumbar    []  Vasopneumatic Device Pressure:       [] lo [] med [] hi   Temperature: [] lo [] med [] hi   [] Skin assessment post-treatment:  []intact []redness- no adverse reaction       []redness  adverse reaction:       62 (44) min Therapeutic Exercise:  [x] See flow sheet :   Rationale: increase ROM and increase strength to improve the patients ability to improve functional abilities          min Patient Education: [x] Review HEP    [] Progressed/Changed HEP based on:   [] positioning   [] body mechanics   [] transfers   [] heat/ice application        Other Objective/Functional Measures:      Pain Level (0-10 scale) post treatment: 5-6/10, no change in radicular sx    ASSESSMENT/Changes in Function:   Patient presents with no change in pain or sx today. Patient is ready to discharge next visit as planned/discussed. Patient will continue to benefit from skilled PT services to modify and progress therapeutic interventions, address functional mobility deficits, address ROM deficits, address strength deficits, analyze and cue movement patterns, analyze and modify body mechanics/ergonomics and assess and modify postural abnormalities to attain remaining goals. []  See Plan of Care   []  See progress note/recertification  []  See Discharge Summary         Progress towards goals / Updated goals:   1. Patient to report centralization of radicular symptoms to low back in order to reduce patient pain. Current: NOT MET, pt reports radicular sx in bilaterally LEs, right>left. 7/11/18   2. Improve FOTO outcome score by 9% to indicate a significant improvement in ADL function  Current:40/100, NOT MET 7/13/18   3. Pt will report  75% improvement with prolonged standing and walking ADL's  Current: Pt reports approximately 60% improvement with tolerating prolonged standing and walking ADL's since initial evaluation. NOT MET, 7/6/18   4. Pt will report 75% improvement with lower extremity flexibility to reduce right radicular symptoms. Current: Pt reports sx are less intense but cannot give a number. NOT MET, 7/13/18      PLAN  [x]  Upgrade activities as tolerated     [x]  Continue plan of care  []  Update interventions per flow sheet       []  Discharge due to:_  [x]  Other:_Discharge at next visit. Aquiles Quispe, INDER 7/20/2018  2:44 PM

## 2018-07-25 ENCOUNTER — HOSPITAL ENCOUNTER (OUTPATIENT)
Dept: PHYSICAL THERAPY | Age: 63
Discharge: HOME | End: 2018-07-25
Payer: MEDICARE

## 2018-07-25 PROCEDURE — 97110 THERAPEUTIC EXERCISES: CPT

## 2018-07-25 PROCEDURE — G8980 MOBILITY D/C STATUS: HCPCS

## 2018-07-25 PROCEDURE — G8979 MOBILITY GOAL STATUS: HCPCS

## 2018-07-25 NOTE — PROGRESS NOTES
PT DAILY TREATMENT NOTE     Patient Name: Malcolm Flores  Date:2018  : 1955  [x]  Patient  Verified  Payor: Ceci Hernandez / Plan: [address] [place] [address] / Product Type: Managed Care Medicare /    In time:1:30  Out time:2:53  Total Treatment Time (min): 83  Total Timed Codes (min): 67  1:1 Treatment Time (min): 30   Visit #: 18 of 18    Treatment Area: Low back pain [M54.5]    SUBJECTIVE  Pain Level (0-10 scale): 7/10  Any medication changes, allergies to medications, adverse drug reactions, diagnosis change, or new procedure performed?: [x] No    [] Yes (see summary sheet for update)  Subjective functional status/changes:   [] No changes reported  I went to see the doctor yesterday and I think that he is going to switch up my nerve medication to see if something different will help with the nerve pain in my legs.     OBJECTIVE  Modality rationale: decrease pain and increase tissue extensibility to improve the patients ability to improve functional abilities   Min Type Additional Details    [] Estim: []Att   []Unatt        []TENS instruct                  []IFC  []Premod   []NMES                     []Other:  []w/US   []w/ice   []w/heat  Position:  Location:    []  Traction: [] Cervical       []Lumbar                       [] Prone          []Supine                       []Intermittent   []Continuous Lbs:  [] before manual  [] after manual    []  Ultrasound: []Continuous   [] Pulsed                           []1MHz   []3MHz Location:  W/cm2:    []  Iontophoresis with dexamethasone         Location: [] Take home patch   [] In clinic   10 []  Ice     [x]  heat  []  Ice massage Position:supine  Location:lumbar    []  Vasopneumatic Device Pressure:       [] lo [] med [] hi   Temperature: [] lo [] med [] hi   [] Skin assessment post-treatment:  []intact []redness- no adverse reaction       []redness  adverse reaction:       73 min Therapeutic Exercise:  [x] See flow sheet :   Rationale: increase ROM and increase strength to improve the patients ability to improve functional abilities           min Patient Education: [x] Review HEP    [] Progressed/Changed HEP based on:   [] positioning   [] body mechanics   [] transfers   [] heat/ice application        Other Objective/Functional Measures:     Pain Level (0-10 scale) post treatment: 0    ASSESSMENT/Changes in Function:   []  See Plan of Care  []  See progress note/recertification  [x]  See Discharge Summary         Progress towards goals / Updated goals:  See discharge summary for full final detailed progress towards all established goals. PLAN  []  Upgrade activities as tolerated     []  Continue plan of care  []  Update interventions per flow sheet       [x]  Discharge due to:Patient has achieved maximum medical benefit/potential from current POC after completing 18 of 18 prescribed visits and is ready for discharge from therapy at this time.   []  Other:Jordana Arroyo, PTA 7/25/2018  1:31 PM

## 2018-07-25 NOTE — PROGRESS NOTES
[facility] AT [address], [address] [place] [address]  Phone: [phone]   [phone]  DISCHARGE SUMMARY  Patient Name: Gera Ramos : 1955   Treatment/Medical Diagnosis: Low back pain [M54.5]   Referral Source: Nicole Catherine MD     Date of Initial Visit: 18 Attended Visits: 18 Missed Visits: 0     SUMMARY OF TREATMENT  Therapeutic exercise for lumbar/LE flexibility, postural awareness/strengthening, lumbopelvic/core stabilization, moist heat and HEP. CURRENT STATUS  Patient reports approximately 70% overall improvement from therapy since initial evaluation with 8/10 pain level on average, increased to 9/10 at the worst with no particular change in activities. Pt has made steady progress with gaining lumbar and LE flexibility as well as advancing with level 2 lumbar stabilization program within current POC over the past several visits. Pt has achieved maximum medical benefit/potential from current POC after completing 18 of 18 prescribed visits and is ready for discharge from therapy at this time. Pt was given and instructed in an updated HEP for continued self maintenance of reduced symptoms after D/C from therapy. Lumbar AROM= Flexion=80%; Extension=70%; Side bending=75% bilaterally   Goal/Measure of Progress Goal Met? 1. Patient to report centralization of radicular symptoms to low back in order to reduce patient pain. Status at last Eval: Pt reports radicular sx in BLEs to feet Current Status: Pt reports radicular sx in BLEs to feet intermittently no   2. Improve FOTO outcome score by 9% to indicate a significant improvement in ADL function   Status at last Eval: 40/100 (53/100 at initial evaluation) Current Status: 53/100 no   3. Pt will report  75% improvement with prolonged standing and walking ADL's   Status at last Eval: 65% improvement reported Current Status: 70-75% improvement reported yes   4.   Pt will report 75% improvement with lower extremity flexibility to reduce right radicular symptoms. Status at last Eval: 60% improvement reported Current Status: 75% improvement reported yes   G-codes=Mobility   Goal  CJ= 20-39%  D/C  CK= 40-59%. The severity rating is based on the FOTO Score  RECOMMENDATIONS  Patient has achieved maximum medical benefit/potential from current POC after completing 18 of 18 prescribed visits and is ready for discharge from therapy at this time. If you have any questions/comments please contact us directly at [phone]. Thank you for allowing us to assist in the care of your patient.     LPTA Signature: Aurea Morgan PTA Date: 7/25/18   Therapist Signature:  Time: 1:38 PM

## 2018-12-04 ENCOUNTER — HOSPITAL ENCOUNTER (OUTPATIENT)
Dept: PHYSICAL THERAPY | Age: 63
Discharge: HOME | End: 2018-12-04
Payer: MEDICARE

## 2018-12-04 PROCEDURE — 97165 OT EVAL LOW COMPLEX 30 MIN: CPT

## 2018-12-04 PROCEDURE — G8987 SELF CARE CURRENT STATUS: HCPCS

## 2018-12-04 PROCEDURE — G8988 SELF CARE GOAL STATUS: HCPCS

## 2018-12-04 PROCEDURE — G8989 SELF CARE D/C STATUS: HCPCS

## 2018-12-04 NOTE — PROGRESS NOTES
OCCUPATIONAL THERAPY - DAILY TREATMENT NOTE Patient Name: Sangeetha Cox        Date: 2018 : 1955   YES Patient  Verified Visit #:   1   of   1  Insurance: Payor: [place] MEDICARE / Plan: VA MEDICARE PART A & B / Product Type: Medicare / In time: 1:50 Out time: 2:50 Total Treatment Time: 60 Medicare Time Tracking (below) Total Timed Codes (min):  na 1:1 Treatment Time:  na  
TREATMENT AREA =  Both thumbs SUBJECTIVE Pain Level (on 0 to 10 scale):  3-4  / 10 Medication Changes/New allergies or changes in medical history, any new surgeries or procedures? NO    If yes, update Summary List  
Subjective Functional Status/Changes:  []  No changes reported It comes and goes (pain) and travels up my arm. OBJECTIVE 
 min Therapeutic Exercise:  [x]  See flow sheet  
 min Patient Education:  YES  Reviewed HEP- AROM for hands  
[]  Progressed/Changed HEP based on: Other Objective/Functional Measures: 
 
See initial evaluation for details. His thumb/arm pain fluctuates. Post Treatment Pain Level (on 0 to 10) scale:   3-4  / 10 ASSESSMENT Assessment/Changes in Function:  
 
Patient presents at/near baseline with ADLs/IADLs and does not have skilled OT needs at this time. Unsure if he has deQuervains, arthritis or overuse syndrome due to hours of tablet/computer use playing games. OT Eval Complexity Justification: 
Patient History: Low-pain prn Examination : Low-pain Clinical Decision Making: Low- desires Ind  
  
[x]  See Progress Note/Recertification Patient will continue to benefit from skilled OT services to na Progress toward goals / Updated goals: 
Evaluation only for exercises and education PLAN 
[]  Upgrade activities as tolerated NO Continue plan of care [x]  Discharge due to : eval only for education   
[]  Other:   
 
Therapist: ELI Avina/L Date: 2018 Time: 3:13 PM

## 2018-12-04 NOTE — PROGRESS NOTES
[address] [facility] PHYSICAL THERAPY 
[address] [place] [address] #[place], [place], [address] - Phone: [phone]  Fax: [phone] PLAN OF CARE / STATEMENT OF MEDICAL NECESSITY FOR OCCUPATIONAL THERAPY SERVICES Patient Name: Jyoti Sharma : 1955 Medical  
Diagnosis: Bilateral hand pain [M79.641, M79.642] Treatment Diagnosis: Thumb pain Onset Date: Years ago Referral Source: Fermín Rojas* Start of Care [facility]): 2018 Prior Hospitalization: See medical history Provider #: [id number] Prior Level of Function: Ind  
Comorbidities: Arthritis Medications: Verified on Patient Summary List  
The Plan of Care and following information is based on the information from the initial evaluation.  
=========================================================================================== Assessment / key information: Patient is a 58 yo right handed male with chandni thumb/wrist/forearm pain. He presents wearing soft wrist based thumb spicas with good fit. Chandni UE AROM is normal including opposition. Chandni UE strength is 4+/5 overall. Right  78# left 71#. Right pinch 13-17# left 14#. Sensation intact to light touch. Pain 3-4/10 prn, fluctuates dorsal thumb to wrist/forearm. 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 He lives alone and is Ind all ADLs/IADLs. Patient reports playing games on his handheld tablet for hours at at time. Suspect he may have gamers thumb, overuse syndrome. Advised to mix up activities, get a volunteer job and limit ricci. Provided AROM exercises for his hands and told to wear thumb spicas for 6-8 weeks to calm tendon.  
=========================================================================================== Eval Complexity: History: LOW Complexity : Brief history review ;  Examination: LOW Complexity : 1-3 performance deficits relating to physical, cognitive , or psychosocial skils that result in activity limitations and / or participation restrictions ; Decision Making:LOW Complexity : No comorbidities that affect functional and no verbal or physical assistance needed to complete eval tasks Problem List: Pain effecting function Patient / Family readiness to learn indicated by: asking questions, trying to perform skills and interest 
Persons(s) to be included in education: patient (P) Barriers to Learning/Limitations: None Measures taken:   
Patient Goal (s): Pain relief Patient self reported health status: fair Rehabilitation Potential: excellent Evaluation only for exercises and education Patient / Caregiver education and instruction: exercises G-Codes (GO): Self Care  Current  CI= 1-19%  Goal  CI= 1-19%  D/C  CI= 1-19%. The severity rating is based on the Other clinic testing Therapist Signature: GHAZAL Alejandro Date: 12/4/2018 Certification Period: 12/4/18-2/1/19 Time: 3:13 PM  
=========================================================================================== I certify that the above Occupational Therapy Services are being furnished while the patient is under my care. I agree with the treatment plan and certify that this therapy is necessary. Physician Signature:       Date:      Time:  Please sign and return to [facility] or you may fax the signed copy to [phone]. Thank you.

## 2018-12-13 ENCOUNTER — OFFICE VISIT (OUTPATIENT)
Dept: ORTHOPEDIC SURGERY | Facility: CLINIC | Age: 63
End: 2018-12-13

## 2018-12-13 VITALS
WEIGHT: 179 LBS | TEMPERATURE: 98.2 F | SYSTOLIC BLOOD PRESSURE: 141 MMHG | HEIGHT: 68 IN | BODY MASS INDEX: 27.13 KG/M2 | HEART RATE: 78 BPM | OXYGEN SATURATION: 99 % | DIASTOLIC BLOOD PRESSURE: 82 MMHG | RESPIRATION RATE: 16 BRPM

## 2018-12-13 DIAGNOSIS — M18.0 ARTHRITIS OF CARPOMETACARPAL (CMC) JOINT OF BOTH THUMBS: ICD-10-CM

## 2018-12-13 DIAGNOSIS — M17.11 PRIMARY OSTEOARTHRITIS OF RIGHT KNEE: ICD-10-CM

## 2018-12-13 DIAGNOSIS — M54.50 LUMBAR PAIN: Primary | ICD-10-CM

## 2018-12-13 DIAGNOSIS — M51.36 DDD (DEGENERATIVE DISC DISEASE), LUMBAR: ICD-10-CM

## 2018-12-13 DIAGNOSIS — M25.551 HIP PAIN, BILATERAL: ICD-10-CM

## 2018-12-13 DIAGNOSIS — G89.29 CHRONIC PAIN OF RIGHT KNEE: ICD-10-CM

## 2018-12-13 DIAGNOSIS — M25.561 CHRONIC PAIN OF RIGHT KNEE: ICD-10-CM

## 2018-12-13 DIAGNOSIS — M25.552 HIP PAIN, BILATERAL: ICD-10-CM

## 2018-12-13 RX ORDER — BUPIVACAINE HYDROCHLORIDE 2.5 MG/ML
4 INJECTION, SOLUTION EPIDURAL; INFILTRATION; INTRACAUDAL ONCE
Qty: 4 ML | Refills: 0
Start: 2018-12-13 | End: 2018-12-13

## 2018-12-13 RX ORDER — LANOLIN ALCOHOL/MO/W.PET/CERES
1000 CREAM (GRAM) TOPICAL DAILY
COMMUNITY

## 2018-12-13 RX ORDER — BETAMETHASONE SODIUM PHOSPHATE AND BETAMETHASONE ACETATE 3; 3 MG/ML; MG/ML
6 INJECTION, SUSPENSION INTRA-ARTICULAR; INTRALESIONAL; INTRAMUSCULAR; SOFT TISSUE ONCE
Qty: 0.5 ML | Refills: 0
Start: 2018-12-13 | End: 2018-12-13
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
## 2018-12-19 DIAGNOSIS — M17.11 OSTEOARTHRITIS OF RIGHT KNEE, UNSPECIFIED OSTEOARTHRITIS TYPE: Primary | ICD-10-CM

## 2019-01-04 ENCOUNTER — APPOINTMENT (OUTPATIENT)
Dept: PHYSICAL THERAPY | Age: 64
End: 2019-01-04
Payer: MEDICARE

## 2019-01-14 ENCOUNTER — HOSPITAL ENCOUNTER (OUTPATIENT)
Dept: PHYSICAL THERAPY | Age: 64
Discharge: HOME | End: 2019-01-14
Payer: MEDICARE

## 2019-01-14 PROCEDURE — 97530 THERAPEUTIC ACTIVITIES: CPT

## 2019-01-14 PROCEDURE — 97163 PT EVAL HIGH COMPLEX 45 MIN: CPT

## 2019-01-14 PROCEDURE — 97110 THERAPEUTIC EXERCISES: CPT

## 2019-01-14 NOTE — PROGRESS NOTES
PT DAILY TREATMENT NOTE 10-18 Patient Name: Camilla Godinez Date:2019 : 1955 [x]  Patient  Verified Payor: Sherrel Frankel / Plan: [address] [place] [address] / Product Type: Managed Care Medicare / In time:250  Out time:325 Total Treatment Time (min): 35 Visit #: 1 of 10 Medicare/BCBS Only Total Timed Codes (min):  23 1:1 Treatment Time:  35 Treatment Area: Low back pain [M54.5] SUBJECTIVE Pain Level (0-10 scale): 8 Any medication changes, allergies to medications, adverse drug reactions, diagnosis change, or new procedure performed?: [x] No    [] Yes (see summary sheet for update) Subjective functional status/changes:   [] No changes reported OBJECTIVE Modality rationale:   
Min Type Additional Details  
 [] Estim:  []Unatt       []IFC  []Premod []Other:  []w/ice   []w/heat Position: Location:  
 [] Estim: []Att    []TENS instruct  []NMES []Other:  []w/US   []w/ice   []w/heat Position: Location:  
 []  Traction: [] Cervical       []Lumbar 
                     [] Prone          []Supine []Intermittent   []Continuous Lbs: 
[] before manual 
[] after manual  
 []  Ultrasound: []Continuous   [] Pulsed []1MHz   []3MHz W/cm2: 
Location:  
 []  Iontophoresis with dexamethasone Location: [] Take home patch  
[] In clinic  
 []  Ice     []  heat 
[]  Ice massage 
[]  Laser  
[]  Anodyne Position: Location:  
 []  Laser with stim 
[]  Other:  Position: Location:  
 []  Vasopneumatic Device Pressure:       [] lo [] med [] hi  
Temperature: [] lo [] med [] hi  
[] Skin assessment post-treatment:  []intact []redness- no adverse reaction 
  []redness  adverse reaction:  
 
12 min [x]Eval                  []Re-Eval  
 
 
10 min Therapeutic Exercise:  [] See flow sheet :  
Rationale: increase ROM and increase strength to improve the patients ability to improve ease of mobility 13 min Therapeutic Activity:  []  See flow sheet :  
Rationale: Patient education on diagnosis with model, benefits of aquatic therapy and reduced body weight environment  to improve the patients ability to improve therapy outcomes With 
 [] TE 
 [] TA 
 [] neuro 
 [] other: Patient Education: [x] Review HEP [] Progressed/Changed HEP based on:  
[] positioning   [] body mechanics   [] transfers   [] heat/ice application   
[] other:   
 
Other Objective/Functional Measures:   
 
Pain Level (0-10 scale) post treatment: 8 
 
ASSESSMENT/Changes in Function:  
 
Patient will continue to benefit from skilled PT services to modify and progress therapeutic interventions, address functional mobility deficits, address ROM deficits, address strength deficits, analyze and address soft tissue restrictions, analyze and cue movement patterns, analyze and modify body mechanics/ergonomics, assess and modify postural abnormalities, address imbalance/dizziness and instruct in home and community integration to attain remaining goals. [x]  See Plan of Care 
[]  See progress note/recertification 
[]  See Discharge Summary Progress towards goals / Updated goals: 
See POC PLAN 
[]  Upgrade activities as tolerated     [x]  Continue plan of care 
[]  Update interventions per flow sheet      
[]  Discharge due to:_ 
[]  Other:_   
 
Emmett Spain, PT 1/14/2019  2:19 PM 
 
Future Appointments Date Time Provider Jaime Westfall 1/14/2019  2:45 PM Maria Luz Benjamin, PT [facility]
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
## 2019-01-22 ENCOUNTER — HOSPITAL ENCOUNTER (OUTPATIENT)
Dept: PHYSICAL THERAPY | Age: 64
Discharge: HOME | End: 2019-01-22
Payer: MEDICARE

## 2019-01-22 PROCEDURE — 97113 AQUATIC THERAPY/EXERCISES: CPT

## 2019-01-24 ENCOUNTER — HOSPITAL ENCOUNTER (OUTPATIENT)
Dept: PHYSICAL THERAPY | Age: 64
Discharge: HOME | End: 2019-01-24
Payer: MEDICARE

## 2019-01-24 PROCEDURE — 97113 AQUATIC THERAPY/EXERCISES: CPT

## 2019-01-29 ENCOUNTER — HOSPITAL ENCOUNTER (OUTPATIENT)
Dept: PHYSICAL THERAPY | Age: 64
Discharge: HOME | End: 2019-01-29
Payer: MEDICARE

## 2019-01-29 PROCEDURE — 97113 AQUATIC THERAPY/EXERCISES: CPT

## 2019-01-31 ENCOUNTER — HOSPITAL ENCOUNTER (OUTPATIENT)
Dept: PHYSICAL THERAPY | Age: 64
Discharge: HOME | End: 2019-01-31
Payer: MEDICARE

## 2019-01-31 PROCEDURE — 97113 AQUATIC THERAPY/EXERCISES: CPT

## 2019-02-05 ENCOUNTER — APPOINTMENT (OUTPATIENT)
Dept: PHYSICAL THERAPY | Age: 64
End: 2019-02-05
Payer: MEDICARE

## 2019-02-07 ENCOUNTER — HOSPITAL ENCOUNTER (OUTPATIENT)
Dept: PHYSICAL THERAPY | Age: 64
Discharge: HOME | End: 2019-02-07
Payer: MEDICARE

## 2019-02-07 PROCEDURE — 97113 AQUATIC THERAPY/EXERCISES: CPT

## 2019-02-12 ENCOUNTER — HOSPITAL ENCOUNTER (OUTPATIENT)
Dept: PHYSICAL THERAPY | Age: 64
Discharge: HOME | End: 2019-02-12
Payer: MEDICARE

## 2019-02-12 PROCEDURE — 97113 AQUATIC THERAPY/EXERCISES: CPT

## 2019-02-12 NOTE — PROGRESS NOTES
[facility] 
[address] [place], [address] [place] [address] 
[phone] fax Continued Plan of Care/ Re-certification for Physical Therapy Services Patient name: Edwin Oliveira Start of Care: 2019 Referral source: Susannah Don MD : 1955 Medical Diagnosis: Low back pain [M54.5] Payor: Carola Meals / [facility] [address] [place] [address] / Product Type: Managed Care Medicare /  Onset Date:18 Treatment Diagnosis: low back, Right knee pain Prior Hospitalization: see medical history Provider#: [id number] Medications: Verified on Patient summary List  
 Comorbidities: heart disease, heart bypass , stroke , diabetes, arthritis, high blood pressure, history of cancer Prior Level of Function: Functionally independent, living alone and managing household on second floor apartment, complex has gym that he has used in the past 
 
Visits from Start of Care: 7    Missed Visits: 0 The Plan of Care and following information is based on the patient's current status: 
Goal: Patient will be independent and compliant with HEP in order to reduce knee instability. Status at last note/certification: Issued and reviewed Current status: met Goal: Patient will initiate aquatic therapy without incident or increase in pain in order to progress toward long term goals. Status at last note/certification: n/a Current status: met Long Term Goals: To be accomplished in 10 treatments: 
Goal: Patient will improve FOTO assessment score to 54 in order to indicate improved functional abilities. Status at last note/certification: 51 
Current status: not met, 47 Goal: Patient will report no limitations or concerns with squatting due to instability in order to improve ease of transfers and daily tasks. Status at last note/certification: limited to squatting only skilled nursing, hesitation with squatting Current status: progressing, slight instability/difficulty Goal: Patient will report no increase in Right LE radicular symptoms with forward flexion in standing in order to indicate centralization of symptoms. Status at last note/certification: increasing foot numbness, leg pain Current status: progressing, variable numbness Goal: Patient will report worst back and Right knee pain as 4/10 in order to improve overall activity tolerance. Status at last note/certification: 6/04 Current status: not met, 8/10 Key functional changes:  
Functional Gains: strength, mobility, sleeping, squatting, able to do more activities in home Functional Deficits: stiffness, numbness, pain in mornings 
% improvement: 50% Pain   Average: 6/10 Best: 5/10 Worst: 8/10 Patient Goal: \"relieve numbness\" Problems/ barriers to goal attainment: none Problem List: pain affecting function, decrease ROM, decrease strength, edema affecting function, impaired gait/ balance, decrease ADL/ functional abilitiies, decrease activity tolerance, decrease flexibility/ joint mobility and decrease transfer abilities Treatment Plan: Therapeutic exercise, Therapeutic activities, Neuromuscular re-education, Physical agent/modality, Gait/balance training, Manual therapy, Aquatic therapy, Patient education, Self Care training, Functional mobility training, Home safety training and Stair training Goals for this certification period to be accomplished in 10 treatments: 
Goal: Patient will improve FOTO assessment score to 54 pts in order to indicate improved functional abilities. Status at last note/certification: 47 pts Goal: Patient will report no limitations or concerns with squatting due to instability in order to improve ease of transfers and daily tasks. Status at last note/certification: slight instability and difficulty Goal: Patient will report no increase in Right LE radicular symptoms with forward flexion in standing in order to indicate centralization of symptoms. Status at last note/certification: variable numbness Goal: Patient will report worst back and Right knee pain as 4/10 in order to improve overall activity tolerance. Status at last note/certification: 5/51 Frequency / Duration: Patient to be seen 2 times per week for 10 treatments: 
Assessment / Recommendations:Patient has consistently attended aquatic therapy for low back and Right knee pain. He has been progressing well with activity tolerance in the pool and is able to report improved stability. Numbness remains a concern. Recommend continuing skilled physical therapy in order to continue to improve stability and ability to perform daily tasks with less challenge. Certification Period: 2/12/19 to 3/13/19 Jayden Maxwelle, PT 2/12/2019 1:16 PM 
 
________________________________________________________________________ I certify that the above Therapy Services are being furnished while the patient is under my care. I agree with the treatment plan and certify that this therapy is necessary. [] I have read the above and request that my patient continue as recommended. [] I have read the above report and request that my patient continue therapy with the following changes/special instructions: ______________________________________ [] I have read the above report and request that my patient be discharged from therapy [de-identified] Signature:____________Date:_________TIME:________ 
 
[employer], Date and Time must be completed for valid certification ** Please sign and return to [facility] [address] 
[phone] fax

## 2019-02-14 ENCOUNTER — HOSPITAL ENCOUNTER (OUTPATIENT)
Dept: PHYSICAL THERAPY | Age: 64
Discharge: HOME | End: 2019-02-14
Payer: MEDICARE

## 2019-02-14 PROCEDURE — 97113 AQUATIC THERAPY/EXERCISES: CPT

## 2019-02-19 ENCOUNTER — HOSPITAL ENCOUNTER (OUTPATIENT)
Dept: PHYSICAL THERAPY | Age: 64
Discharge: HOME | End: 2019-02-19
Payer: MEDICARE

## 2019-02-19 PROCEDURE — 97113 AQUATIC THERAPY/EXERCISES: CPT

## 2019-02-21 ENCOUNTER — HOSPITAL ENCOUNTER (OUTPATIENT)
Dept: PHYSICAL THERAPY | Age: 64
Discharge: HOME | End: 2019-02-21
Payer: MEDICARE

## 2019-02-21 PROCEDURE — 97113 AQUATIC THERAPY/EXERCISES: CPT

## 2019-02-26 ENCOUNTER — HOSPITAL ENCOUNTER (OUTPATIENT)
Dept: PHYSICAL THERAPY | Age: 64
Discharge: HOME | End: 2019-02-26
Payer: MEDICARE

## 2019-02-26 PROCEDURE — 97113 AQUATIC THERAPY/EXERCISES: CPT

## 2019-02-28 ENCOUNTER — HOSPITAL ENCOUNTER (OUTPATIENT)
Dept: PHYSICAL THERAPY | Age: 64
Discharge: HOME | End: 2019-02-28
Payer: MEDICARE

## 2019-02-28 PROCEDURE — 97113 AQUATIC THERAPY/EXERCISES: CPT

## 2019-02-28 NOTE — PROGRESS NOTES
[facility] 
[address] [place], [address] [place] [address] 
[phone] fax Discharge Summary Patient name: Gustavo Amador Start of Care: 2019 Referral More Keen MD : 1955               
Medical Diagnosis: Low back pain [M54.5]  
Payor: [facility] MEDICARE / Plan: [address] [place] [address] / Product Type: Managed Care Medicare /  Onset Date:18               
Treatment Diagnosis: low back, Right knee pain Prior Hospitalization: see medical history Provider#: [id number] Medications: Verified on Patient summary List  
 Comorbidities: heart disease, heart bypass , stroke , diabetes, arthritis, high blood pressure, history of cancer 
 Prior Level of Function: Functionally independent, living alone and managing household on second floor apartment, complex has gym that he has used in the past 
 
Visits from Start of Care: 12    Missed Visits: 0 Reporting Period : 19 to 19 Goal: Patient will improve FOTO assessment score to 54 pts in order to indicate improved functional abilities. Status at last note/certification: 47 pts Status at discharge: met, 60 pts Goal: Patient will report no limitations or concerns with squatting due to instability in order to improve ease of transfers and daily tasks. Status at last note/certification: slight instability and difficulty Status at discharge: progressing, slight difficulty Goal: Patient will report no increase in Right LE radicular symptoms with forward flexion in standing in order to indicate centralization of symptoms. Status at last note/certification: variable numbness Status at discharge: met Goal: Patient will report worst back and Right knee pain as 4/10 in order to improve overall activity tolerance. Status at last note/certification:  Status at discharge: not met, 8/10 Assessment/ Summary of Care: Patient has consistently attended aquatic therapy for back and Right knee pain, reporting progressively decreasing pain levels and overall improving activity tolerance. Currently he is independent with aquatic routine and [facility]; we will therefore discharge to independent management. Thank you for the referral of this Patient. RECOMMENDATIONS: 
[x]Discontinue therapy: [x]Patient has reached or is progressing toward set goals []Patient is non-compliant or has abdicated 
    []Due to lack of appreciable progress towards set goals Debra Crockett, PT 2/28/2019 9:05 AM 
 
NOTE TO PHYSICIAN:  Please complete the following and fax to: [facility] at [phone] Delia Muir Retain this original for your records. If you are unable to process this request in  
24 hours, please contact our office. [] I have read the above report and request that my patient continue therapy with the following changes/special instructions: 
[] I have read the above report and request that my patient be discharged from therapy [de-identified] Signature:____________Date:_________TIME:________ 
 
[employer], Date and Time must be completed for valid certification **

## 2019-04-01 ENCOUNTER — OFFICE VISIT (OUTPATIENT)
Dept: ORTHOPEDIC SURGERY | Facility: CLINIC | Age: 64
End: 2019-04-01

## 2019-04-01 VITALS
HEIGHT: 68 IN | HEART RATE: 70 BPM | SYSTOLIC BLOOD PRESSURE: 141 MMHG | TEMPERATURE: 98.3 F | DIASTOLIC BLOOD PRESSURE: 84 MMHG | OXYGEN SATURATION: 97 % | RESPIRATION RATE: 18 BRPM | WEIGHT: 170.8 LBS | BODY MASS INDEX: 25.88 KG/M2

## 2019-04-01 DIAGNOSIS — M54.50 LUMBAR PAIN: ICD-10-CM

## 2019-04-01 DIAGNOSIS — M25.561 CHRONIC PAIN OF RIGHT KNEE: ICD-10-CM

## 2019-04-01 DIAGNOSIS — M70.61 GREATER TROCHANTERIC BURSITIS OF RIGHT HIP: ICD-10-CM

## 2019-04-01 DIAGNOSIS — M51.36 DDD (DEGENERATIVE DISC DISEASE), LUMBAR: ICD-10-CM

## 2019-04-01 DIAGNOSIS — M16.11 PRIMARY OSTEOARTHRITIS OF RIGHT HIP: Primary | ICD-10-CM

## 2019-04-01 DIAGNOSIS — M17.11 PRIMARY OSTEOARTHRITIS OF RIGHT KNEE: ICD-10-CM

## 2019-04-01 DIAGNOSIS — M06.9 RHEUMATOID ARTHRITIS, INVOLVING UNSPECIFIED SITE, UNSPECIFIED RHEUMATOID FACTOR PRESENCE: ICD-10-CM

## 2019-04-01 DIAGNOSIS — G89.29 CHRONIC PAIN OF RIGHT KNEE: ICD-10-CM

## 2019-04-01 DIAGNOSIS — M25.551 RIGHT HIP PAIN: ICD-10-CM

## 2019-04-01 RX ORDER — BUPIVACAINE HYDROCHLORIDE 2.5 MG/ML
4 INJECTION, SOLUTION EPIDURAL; INFILTRATION; INTRACAUDAL ONCE
Qty: 4 ML | Refills: 0
Start: 2019-04-01 | End: 2019-04-01

## 2019-04-01 RX ORDER — DULOXETIN HYDROCHLORIDE 60 MG/1
CAPSULE, DELAYED RELEASE ORAL
COMMUNITY
Start: 2018-10-23

## 2019-04-01 RX ORDER — TRAMADOL HYDROCHLORIDE 50 MG/1
TABLET ORAL
Refills: 0 | COMMUNITY
Start: 2019-03-29

## 2019-04-01 RX ORDER — TRIAMCINOLONE ACETONIDE 40 MG/ML
40 INJECTION, SUSPENSION INTRA-ARTICULAR; INTRAMUSCULAR ONCE
Qty: 0.5 ML | Refills: 0
Start: 2019-04-01 | End: 2019-04-01
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
## 2019-07-11 ENCOUNTER — OFFICE VISIT (OUTPATIENT)
Dept: ORTHOPEDIC SURGERY | Facility: CLINIC | Age: 64
End: 2019-07-11

## 2019-07-11 VITALS
BODY MASS INDEX: 25.34 KG/M2 | WEIGHT: 167.2 LBS | SYSTOLIC BLOOD PRESSURE: 127 MMHG | HEIGHT: 68 IN | TEMPERATURE: 98.9 F | HEART RATE: 75 BPM | RESPIRATION RATE: 18 BRPM | DIASTOLIC BLOOD PRESSURE: 80 MMHG | OXYGEN SATURATION: 97 %

## 2019-07-11 DIAGNOSIS — M17.11 PRIMARY OSTEOARTHRITIS OF RIGHT KNEE: ICD-10-CM

## 2019-07-11 DIAGNOSIS — M06.9 RHEUMATOID ARTHRITIS, INVOLVING UNSPECIFIED SITE, UNSPECIFIED RHEUMATOID FACTOR PRESENCE: ICD-10-CM

## 2019-07-11 DIAGNOSIS — M16.11 PRIMARY OSTEOARTHRITIS OF RIGHT HIP: Primary | ICD-10-CM

## 2019-07-11 DIAGNOSIS — G89.29 CHRONIC PAIN OF RIGHT KNEE: ICD-10-CM

## 2019-07-11 DIAGNOSIS — M25.561 CHRONIC PAIN OF RIGHT KNEE: ICD-10-CM

## 2019-07-11 DIAGNOSIS — M25.551 RIGHT HIP PAIN: ICD-10-CM

## 2019-07-11 RX ORDER — BETAMETHASONE SODIUM PHOSPHATE AND BETAMETHASONE ACETATE 3; 3 MG/ML; MG/ML
6 INJECTION, SUSPENSION INTRA-ARTICULAR; INTRALESIONAL; INTRAMUSCULAR; SOFT TISSUE ONCE
Qty: 0.5 ML | Refills: 0
Start: 2019-07-11 | End: 2019-07-11
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
## 2020-03-12 ENCOUNTER — OFFICE VISIT (OUTPATIENT)
Dept: ORTHOPEDIC SURGERY | Facility: CLINIC | Age: 65
End: 2020-03-12

## 2020-03-12 VITALS
HEIGHT: 68 IN | WEIGHT: 169.2 LBS | BODY MASS INDEX: 25.64 KG/M2 | TEMPERATURE: 98.3 F | SYSTOLIC BLOOD PRESSURE: 137 MMHG | DIASTOLIC BLOOD PRESSURE: 93 MMHG | RESPIRATION RATE: 18 BRPM | OXYGEN SATURATION: 100 % | HEART RATE: 69 BPM

## 2020-03-12 DIAGNOSIS — M17.11 PRIMARY OSTEOARTHRITIS OF RIGHT KNEE: Primary | ICD-10-CM

## 2020-03-12 DIAGNOSIS — G89.29 CHRONIC PAIN OF RIGHT KNEE: ICD-10-CM

## 2020-03-12 DIAGNOSIS — M25.561 CHRONIC PAIN OF RIGHT KNEE: ICD-10-CM

## 2020-03-12 RX ORDER — TRIAMCINOLONE ACETONIDE 40 MG/ML
40 INJECTION, SUSPENSION INTRA-ARTICULAR; INTRAMUSCULAR ONCE
Qty: 1 ML | Refills: 0
Start: 2020-03-12 | End: 2020-03-12

## 2020-03-12 NOTE — PROGRESS NOTES
Chief Complaint   Patient presents with    Hip Pain     Right    Knee Pain     Right     8/10 pain.
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
## 2020-07-27 ENCOUNTER — OFFICE VISIT (OUTPATIENT)
Dept: ORTHOPEDIC SURGERY | Facility: CLINIC | Age: 65
End: 2020-07-27

## 2020-07-27 VITALS
BODY MASS INDEX: 25.25 KG/M2 | SYSTOLIC BLOOD PRESSURE: 141 MMHG | TEMPERATURE: 96.6 F | HEIGHT: 68 IN | WEIGHT: 166.6 LBS | DIASTOLIC BLOOD PRESSURE: 83 MMHG | HEART RATE: 62 BPM | RESPIRATION RATE: 12 BRPM | OXYGEN SATURATION: 98 %

## 2020-07-27 DIAGNOSIS — M16.11 PRIMARY OSTEOARTHRITIS OF RIGHT HIP: ICD-10-CM

## 2020-07-27 DIAGNOSIS — M25.551 CHRONIC RIGHT HIP PAIN: ICD-10-CM

## 2020-07-27 DIAGNOSIS — M25.561 CHRONIC PAIN OF RIGHT KNEE: ICD-10-CM

## 2020-07-27 DIAGNOSIS — M17.11 PRIMARY OSTEOARTHRITIS OF RIGHT KNEE: Primary | ICD-10-CM

## 2020-07-27 DIAGNOSIS — G89.29 CHRONIC PAIN OF RIGHT KNEE: ICD-10-CM

## 2020-07-27 DIAGNOSIS — G89.29 CHRONIC RIGHT HIP PAIN: ICD-10-CM

## 2020-07-27 RX ORDER — BETAMETHASONE SODIUM PHOSPHATE AND BETAMETHASONE ACETATE 3; 3 MG/ML; MG/ML
6 INJECTION, SUSPENSION INTRA-ARTICULAR; INTRALESIONAL; INTRAMUSCULAR; SOFT TISSUE ONCE
Qty: 0.5 ML | Refills: 0
Start: 2020-07-27 | End: 2020-07-27
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
## 2020-07-27 NOTE — PROGRESS NOTES
Verbal order given by Dr. Em Mantle to draw up 4 mL 0.25% Sensorcaine and 0.5 mL 30 mg/5mL Betamethasone.

## 2020-08-31 ENCOUNTER — OFFICE VISIT (OUTPATIENT)
Dept: ORTHOPEDIC SURGERY | Facility: CLINIC | Age: 65
End: 2020-08-31

## 2020-08-31 VITALS
OXYGEN SATURATION: 96 % | RESPIRATION RATE: 15 BRPM | TEMPERATURE: 96.8 F | WEIGHT: 156.2 LBS | DIASTOLIC BLOOD PRESSURE: 97 MMHG | HEART RATE: 92 BPM | HEIGHT: 68 IN | SYSTOLIC BLOOD PRESSURE: 144 MMHG | BODY MASS INDEX: 23.67 KG/M2

## 2020-08-31 DIAGNOSIS — M25.561 CHRONIC PAIN OF RIGHT KNEE: ICD-10-CM

## 2020-08-31 DIAGNOSIS — M16.11 PRIMARY OSTEOARTHRITIS OF RIGHT HIP: Primary | ICD-10-CM

## 2020-08-31 DIAGNOSIS — M25.551 CHRONIC RIGHT HIP PAIN: ICD-10-CM

## 2020-08-31 DIAGNOSIS — G89.29 CHRONIC PAIN OF RIGHT KNEE: ICD-10-CM

## 2020-08-31 DIAGNOSIS — G89.29 CHRONIC RIGHT HIP PAIN: ICD-10-CM

## 2020-08-31 DIAGNOSIS — M17.11 PRIMARY OSTEOARTHRITIS OF RIGHT KNEE: ICD-10-CM
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
## 2020-09-17 ENCOUNTER — OFFICE VISIT (OUTPATIENT)
Dept: ORTHOPEDIC SURGERY | Facility: CLINIC | Age: 65
End: 2020-09-17

## 2020-09-17 VITALS
HEIGHT: 68 IN | WEIGHT: 160 LBS | RESPIRATION RATE: 16 BRPM | TEMPERATURE: 97.4 F | HEART RATE: 74 BPM | BODY MASS INDEX: 24.25 KG/M2 | OXYGEN SATURATION: 99 % | SYSTOLIC BLOOD PRESSURE: 154 MMHG | DIASTOLIC BLOOD PRESSURE: 91 MMHG

## 2020-09-17 DIAGNOSIS — M54.16 LUMBAR RADICULOPATHY: ICD-10-CM

## 2020-09-17 DIAGNOSIS — M54.50 LUMBAR PAIN: ICD-10-CM

## 2020-09-17 DIAGNOSIS — G89.29 CHRONIC RIGHT HIP PAIN: ICD-10-CM

## 2020-09-17 DIAGNOSIS — G89.29 CHRONIC PAIN OF RIGHT KNEE: ICD-10-CM

## 2020-09-17 DIAGNOSIS — M16.11 PRIMARY OSTEOARTHRITIS OF RIGHT HIP: ICD-10-CM

## 2020-09-17 DIAGNOSIS — M25.561 CHRONIC PAIN OF RIGHT KNEE: ICD-10-CM

## 2020-09-17 DIAGNOSIS — M17.11 PRIMARY OSTEOARTHRITIS OF RIGHT KNEE: Primary | ICD-10-CM

## 2020-09-17 DIAGNOSIS — M25.551 CHRONIC RIGHT HIP PAIN: ICD-10-CM

## 2020-09-17 RX ORDER — HYDROCODONE BITARTRATE AND ACETAMINOPHEN 5; 325 MG/1; MG/1
TABLET ORAL
COMMUNITY
Start: 2020-09-08 | End: 2020-09-23

## 2020-09-17 RX ORDER — BETAMETHASONE SODIUM PHOSPHATE AND BETAMETHASONE ACETATE 3; 3 MG/ML; MG/ML
6 INJECTION, SUSPENSION INTRA-ARTICULAR; INTRALESIONAL; INTRAMUSCULAR; SOFT TISSUE ONCE
Qty: 0.5 ML | Refills: 0
Start: 2020-09-17 | End: 2020-09-17

## 2020-09-17 RX ORDER — HYDROCODONE BITARTRATE AND ACETAMINOPHEN 5; 325 MG/1; MG/1
TABLET ORAL
COMMUNITY
Start: 2020-09-08
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
## 2020-09-20 PROBLEM — I21.02 ST ELEVATION (STEMI) MYOCARDIAL INFARCTION INVOLVING LEFT ANTERIOR DESCENDING CORONARY ARTERY (HCC): Status: ACTIVE | Noted: 2020-09-20

## 2020-09-20 PROBLEM — I21.3 STEMI (ST ELEVATION MYOCARDIAL INFARCTION) (HCC): Status: ACTIVE | Noted: 2020-09-20

## 2020-10-07 ENCOUNTER — DOCUMENTATION ONLY (OUTPATIENT)
Dept: ORTHOPEDIC SURGERY | Age: 65
End: 2020-10-07

## 2020-10-22 ENCOUNTER — OFFICE VISIT (OUTPATIENT)
Dept: ORTHOPEDIC SURGERY | Age: 65
End: 2020-10-22
Payer: MEDICARE

## 2020-10-22 VITALS
BODY MASS INDEX: 22.73 KG/M2 | DIASTOLIC BLOOD PRESSURE: 66 MMHG | RESPIRATION RATE: 16 BRPM | WEIGHT: 150 LBS | HEIGHT: 68 IN | SYSTOLIC BLOOD PRESSURE: 114 MMHG | OXYGEN SATURATION: 97 % | TEMPERATURE: 97 F | HEART RATE: 60 BPM

## 2020-10-22 DIAGNOSIS — M25.551 CHRONIC RIGHT HIP PAIN: ICD-10-CM

## 2020-10-22 DIAGNOSIS — G89.29 CHRONIC PAIN OF RIGHT KNEE: ICD-10-CM

## 2020-10-22 DIAGNOSIS — M17.11 PRIMARY OSTEOARTHRITIS OF RIGHT KNEE: Primary | ICD-10-CM

## 2020-10-22 DIAGNOSIS — M16.11 PRIMARY OSTEOARTHRITIS OF RIGHT HIP: ICD-10-CM

## 2020-10-22 DIAGNOSIS — M25.561 CHRONIC PAIN OF RIGHT KNEE: ICD-10-CM

## 2020-10-22 DIAGNOSIS — G89.29 CHRONIC RIGHT HIP PAIN: ICD-10-CM

## 2020-10-22 PROCEDURE — 3017F COLORECTAL CA SCREEN DOC REV: CPT | Performed by: SPECIALIST

## 2020-10-22 PROCEDURE — G8510 SCR DEP NEG, NO PLAN REQD: HCPCS | Performed by: SPECIALIST

## 2020-10-22 PROCEDURE — 99213 OFFICE O/P EST LOW 20 MIN: CPT | Performed by: SPECIALIST

## 2020-10-22 PROCEDURE — 1111F DSCHRG MED/CURRENT MED MERGE: CPT | Performed by: SPECIALIST

## 2020-10-22 PROCEDURE — 20610 DRAIN/INJ JOINT/BURSA W/O US: CPT | Performed by: SPECIALIST

## 2020-10-22 PROCEDURE — G8420 CALC BMI NORM PARAMETERS: HCPCS | Performed by: SPECIALIST

## 2020-10-22 PROCEDURE — G8427 DOCREV CUR MEDS BY ELIG CLIN: HCPCS | Performed by: SPECIALIST

## 2020-10-22 RX ORDER — HYALURONATE SODIUM 10 MG/ML
2 SYRINGE (ML) INTRAARTICULAR ONCE
Qty: 2 ML | Refills: 0
Start: 2020-10-22 | End: 2020-10-22

## 2020-10-22 RX ORDER — BETAMETHASONE SODIUM PHOSPHATE AND BETAMETHASONE ACETATE 3; 3 MG/ML; MG/ML
6 INJECTION, SUSPENSION INTRA-ARTICULAR; INTRALESIONAL; INTRAMUSCULAR; SOFT TISSUE ONCE
Qty: 0.5 ML | Refills: 0
Start: 2020-10-22 | End: 2020-10-22
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
## 2020-10-29 ENCOUNTER — OFFICE VISIT (OUTPATIENT)
Dept: ORTHOPEDIC SURGERY | Age: 65
End: 2020-10-29
Payer: MEDICARE

## 2020-10-29 VITALS
TEMPERATURE: 96.8 F | DIASTOLIC BLOOD PRESSURE: 71 MMHG | HEART RATE: 68 BPM | HEIGHT: 68 IN | OXYGEN SATURATION: 96 % | WEIGHT: 151 LBS | RESPIRATION RATE: 16 BRPM | BODY MASS INDEX: 22.88 KG/M2 | SYSTOLIC BLOOD PRESSURE: 115 MMHG

## 2020-10-29 DIAGNOSIS — M25.561 CHRONIC PAIN OF RIGHT KNEE: ICD-10-CM

## 2020-10-29 DIAGNOSIS — G89.29 CHRONIC PAIN OF RIGHT KNEE: ICD-10-CM

## 2020-10-29 DIAGNOSIS — M17.11 PRIMARY OSTEOARTHRITIS OF RIGHT KNEE: Primary | ICD-10-CM

## 2020-10-29 PROCEDURE — 20610 DRAIN/INJ JOINT/BURSA W/O US: CPT | Performed by: SPECIALIST

## 2020-10-29 RX ORDER — HYALURONATE SODIUM 10 MG/ML
2 SYRINGE (ML) INTRAARTICULAR ONCE
Qty: 2 ML | Refills: 0
Start: 2020-10-29 | End: 2020-10-29

## 2020-10-29 NOTE — PROGRESS NOTES
Patient: Leda Roblero                MRN: [id number]       SSN: [id number]  YOB: 1955        AGE: 60 y.o. SEX: male  Body mass index is 22.96 kg/m². PCP: Rylie Malcolm MD  10/29/20    Chief Complaint   Patient presents with    Knee Pain     bilateral     HISTORY:  Leda Roblero is a 60 y.o. male who is seen for right knee pain. ICD-10-CM ICD-9-CM    1. Primary osteoarthritis of right knee  M17.11 715.16 [place] DRAIN/INJECT LARGE JOINT/BURSA      EUFLEXXA INJECTION PER DOSE      sodium hyaluronate (SUPARTZ FX/HYALGAN/GENIVSC) 10 mg/mL syrg injection   2. Chronic pain of right knee  M25.561 719.46 [place] DRAIN/INJECT LARGE JOINT/BURSA    G89.29 338.29 EUFLEXXA INJECTION PER DOSE      sodium hyaluronate (SUPARTZ FX/HYALGAN/GENIVSC) 10 mg/mL syrg injection       Chart reviewed for the following:   Guillermina Luong MD, have reviewed the History, Physical and updated the Allergic reactions for Gustavo Ray     TIME OUT performed immediately prior to start of procedure:  Guillermina Luong MD, have performed the following reviews on Gustavo Ray prior to the start of the procedure:            * Patient was identified by name and date of birth   * Agreement on procedure being performed was verified  * Risks and Benefits explained to the patient  * Procedure site verified and marked as necessary  * Patient was positioned for comfort  * Consent was obtained     Time: 11:03 AM     Date of procedure: 10/29/2020    Procedure performed by:  Luis Alberto Forrest MD    Mr. Vincent Blackman tolerated the procedure well with no complications. PLAN:  After discussing treatment options, patient's right knee was injected with 2 cc of Euflexxa. Mr. Vincent Blackman will follow up in one week to complete his visco supplementation injection series.       Scribed by Gladys Samano ([address]) as dictated by Luis Alberto Forrest MD

## 2021-01-07 ENCOUNTER — OFFICE VISIT (OUTPATIENT)
Dept: ORTHOPEDIC SURGERY | Age: 66
End: 2021-01-07
Payer: MEDICARE

## 2021-01-07 VITALS
HEART RATE: 67 BPM | TEMPERATURE: 96.6 F | OXYGEN SATURATION: 98 % | WEIGHT: 152 LBS | DIASTOLIC BLOOD PRESSURE: 71 MMHG | RESPIRATION RATE: 16 BRPM | SYSTOLIC BLOOD PRESSURE: 124 MMHG | HEIGHT: 68 IN | BODY MASS INDEX: 23.04 KG/M2

## 2021-01-07 DIAGNOSIS — M17.11 PRIMARY OSTEOARTHRITIS OF RIGHT KNEE: Primary | ICD-10-CM

## 2021-01-07 DIAGNOSIS — G89.29 CHRONIC PAIN OF RIGHT KNEE: ICD-10-CM

## 2021-01-07 DIAGNOSIS — M25.561 CHRONIC PAIN OF RIGHT KNEE: ICD-10-CM

## 2021-01-07 PROCEDURE — G8752 SYS BP LESS 140: HCPCS | Performed by: SPECIALIST

## 2021-01-07 PROCEDURE — G8427 DOCREV CUR MEDS BY ELIG CLIN: HCPCS | Performed by: SPECIALIST

## 2021-01-07 PROCEDURE — G8420 CALC BMI NORM PARAMETERS: HCPCS | Performed by: SPECIALIST

## 2021-01-07 PROCEDURE — 99213 OFFICE O/P EST LOW 20 MIN: CPT | Performed by: SPECIALIST

## 2021-01-07 PROCEDURE — 20610 DRAIN/INJ JOINT/BURSA W/O US: CPT | Performed by: SPECIALIST

## 2021-01-07 PROCEDURE — G8754 DIAS BP LESS 90: HCPCS | Performed by: SPECIALIST

## 2021-01-07 PROCEDURE — G8510 SCR DEP NEG, NO PLAN REQD: HCPCS | Performed by: SPECIALIST

## 2021-01-07 PROCEDURE — 3017F COLORECTAL CA SCREEN DOC REV: CPT | Performed by: SPECIALIST

## 2021-01-07 RX ORDER — BETAMETHASONE SODIUM PHOSPHATE AND BETAMETHASONE ACETATE 3; 3 MG/ML; MG/ML
3 INJECTION, SUSPENSION INTRA-ARTICULAR; INTRALESIONAL; INTRAMUSCULAR; SOFT TISSUE ONCE
Status: COMPLETED | OUTPATIENT
Start: 2021-01-07 | End: 2021-01-07

## 2021-01-07 RX ADMIN — BETAMETHASONE SODIUM PHOSPHATE AND BETAMETHASONE ACETATE 3 MG: 3; 3 INJECTION, SUSPENSION INTRA-ARTICULAR; INTRALESIONAL; INTRAMUSCULAR; SOFT TISSUE at 11:56
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
## 2021-02-05 ENCOUNTER — DOCUMENTATION ONLY (OUTPATIENT)
Dept: ORTHOPEDIC SURGERY | Age: 66
End: 2021-02-05

## 2021-02-05 ENCOUNTER — OFFICE VISIT (OUTPATIENT)
Dept: ORTHOPEDIC SURGERY | Age: 66
End: 2021-02-05
Payer: MEDICARE

## 2021-02-05 VITALS
BODY MASS INDEX: 22.36 KG/M2 | HEIGHT: 69 IN | SYSTOLIC BLOOD PRESSURE: 122 MMHG | WEIGHT: 151 LBS | TEMPERATURE: 96.9 F | HEART RATE: 69 BPM | OXYGEN SATURATION: 96 % | DIASTOLIC BLOOD PRESSURE: 68 MMHG

## 2021-02-05 DIAGNOSIS — G89.29 CHRONIC PAIN OF RIGHT KNEE: ICD-10-CM

## 2021-02-05 DIAGNOSIS — M25.561 CHRONIC PAIN OF RIGHT KNEE: ICD-10-CM

## 2021-02-05 DIAGNOSIS — Z86.73 HISTORY OF CVA (CEREBROVASCULAR ACCIDENT): ICD-10-CM

## 2021-02-05 DIAGNOSIS — M17.11 PRIMARY OSTEOARTHRITIS OF RIGHT KNEE: ICD-10-CM

## 2021-02-05 DIAGNOSIS — M17.12 PRIMARY OSTEOARTHRITIS OF LEFT KNEE: Primary | ICD-10-CM

## 2021-02-05 DIAGNOSIS — M79.661 RIGHT CALF PAIN: ICD-10-CM

## 2021-02-05 DIAGNOSIS — R25.2 SPASTICITY: ICD-10-CM

## 2021-02-05 PROCEDURE — G8432 DEP SCR NOT DOC, RNG: HCPCS | Performed by: SPECIALIST

## 2021-02-05 PROCEDURE — 99213 OFFICE O/P EST LOW 20 MIN: CPT | Performed by: SPECIALIST

## 2021-02-05 PROCEDURE — G8754 DIAS BP LESS 90: HCPCS | Performed by: SPECIALIST

## 2021-02-05 PROCEDURE — 3017F COLORECTAL CA SCREEN DOC REV: CPT | Performed by: SPECIALIST

## 2021-02-05 PROCEDURE — 20610 DRAIN/INJ JOINT/BURSA W/O US: CPT | Performed by: SPECIALIST

## 2021-02-05 PROCEDURE — G8420 CALC BMI NORM PARAMETERS: HCPCS | Performed by: SPECIALIST

## 2021-02-05 PROCEDURE — G8427 DOCREV CUR MEDS BY ELIG CLIN: HCPCS | Performed by: SPECIALIST

## 2021-02-05 PROCEDURE — G8752 SYS BP LESS 140: HCPCS | Performed by: SPECIALIST

## 2021-02-05 RX ORDER — BETAMETHASONE SODIUM PHOSPHATE AND BETAMETHASONE ACETATE 3; 3 MG/ML; MG/ML
3 INJECTION, SUSPENSION INTRA-ARTICULAR; INTRALESIONAL; INTRAMUSCULAR; SOFT TISSUE ONCE
Status: COMPLETED | OUTPATIENT
Start: 2021-02-05 | End: 2021-02-05

## 2021-02-05 RX ADMIN — BETAMETHASONE SODIUM PHOSPHATE AND BETAMETHASONE ACETATE 3 MG: 3; 3 INJECTION, SUSPENSION INTRA-ARTICULAR; INTRALESIONAL; INTRAMUSCULAR; SOFT TISSUE at 14:58

## 2021-02-05 NOTE — PROGRESS NOTES
STAT DUPLEX TO R/O DVT WAS ORDERED BY DR. Vikki Garcia. PATIENT HAD TO DECLINE AN APPOINTMENT FOR 4 PM TODAY AT [facility], DUE TO TRANSPORTATION. HE RELIES ON A MEDICAID CAB AND THEY WILL ONLY DO ONE VISIT PER DAY. HE WAS RE-SCHEDULED FOR [facility] ON 2/8 AT 11 AM. IT WAS STRESSED TO THE PATIENT THAT HE SHOULD NOT WAIT ANY LONGER THAN ABSOLUTELY NECESSARY TO HAVE THIS STUDY COMPLETED.
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
## 2022-04-05 ENCOUNTER — INPATIENT (INPATIENT)
Facility: HOSPITAL | Age: 67
LOS: 7 days | Discharge: HOME | DRG: 286 | End: 2022-04-13
Attending: INTERNAL MEDICINE | Admitting: INTERNAL MEDICINE
Payer: MEDICARE

## 2022-04-05 ENCOUNTER — EMERGENCY (EMERGENCY)
Facility: HOSPITAL | Age: 67
LOS: 1 days | Discharge: ANOTHER HOSPITAL | End: 2022-04-05
Attending: EMERGENCY MEDICINE
Payer: MEDICARE

## 2022-04-05 VITALS
RESPIRATION RATE: 20 BRPM | DIASTOLIC BLOOD PRESSURE: 69 MMHG | HEART RATE: 67 BPM | SYSTOLIC BLOOD PRESSURE: 115 MMHG | OXYGEN SATURATION: 95 % | TEMPERATURE: 98 F

## 2022-04-05 VITALS
HEIGHT: 68 IN | RESPIRATION RATE: 18 BRPM | SYSTOLIC BLOOD PRESSURE: 115 MMHG | OXYGEN SATURATION: 100 % | TEMPERATURE: 97 F | DIASTOLIC BLOOD PRESSURE: 66 MMHG | WEIGHT: 141.98 LBS | HEART RATE: 65 BPM

## 2022-04-05 VITALS
DIASTOLIC BLOOD PRESSURE: 62 MMHG | HEART RATE: 68 BPM | TEMPERATURE: 98 F | OXYGEN SATURATION: 99 % | SYSTOLIC BLOOD PRESSURE: 109 MMHG | RESPIRATION RATE: 18 BRPM

## 2022-04-05 LAB
ALBUMIN SERPL ELPH-MCNC: 3.3 G/DL — LOW (ref 3.5–5)
ALBUMIN SERPL ELPH-MCNC: 3.7 G/DL — SIGNIFICANT CHANGE UP (ref 3.3–5)
ALP SERPL-CCNC: 115 U/L — SIGNIFICANT CHANGE UP (ref 40–120)
ALP SERPL-CCNC: 124 U/L — HIGH (ref 40–120)
ALT FLD-CCNC: 25 U/L — SIGNIFICANT CHANGE UP (ref 10–45)
ALT FLD-CCNC: 35 U/L DA — SIGNIFICANT CHANGE UP (ref 10–60)
ANION GAP SERPL CALC-SCNC: 14 MMOL/L — SIGNIFICANT CHANGE UP (ref 5–17)
ANION GAP SERPL CALC-SCNC: 8 MMOL/L — SIGNIFICANT CHANGE UP (ref 5–17)
APTT BLD: 35.7 SEC — HIGH (ref 27.5–35.5)
AST SERPL-CCNC: 41 U/L — HIGH (ref 10–40)
AST SERPL-CCNC: 42 U/L — HIGH (ref 10–40)
BASE EXCESS BLDV CALC-SCNC: -4.6 MMOL/L — LOW (ref -2–2)
BASOPHILS # BLD AUTO: 0.04 K/UL — SIGNIFICANT CHANGE UP (ref 0–0.2)
BASOPHILS NFR BLD AUTO: 0.6 % — SIGNIFICANT CHANGE UP (ref 0–2)
BILIRUB SERPL-MCNC: 0.9 MG/DL — SIGNIFICANT CHANGE UP (ref 0.2–1.2)
BILIRUB SERPL-MCNC: 1 MG/DL — SIGNIFICANT CHANGE UP (ref 0.2–1.2)
BUN SERPL-MCNC: 21 MG/DL — SIGNIFICANT CHANGE UP (ref 7–23)
BUN SERPL-MCNC: 22 MG/DL — HIGH (ref 7–18)
CA-I SERPL-SCNC: 0.86 MMOL/L — LOW (ref 1.15–1.33)
CALCIUM SERPL-MCNC: 9.1 MG/DL — SIGNIFICANT CHANGE UP (ref 8.4–10.5)
CALCIUM SERPL-MCNC: 9.3 MG/DL — SIGNIFICANT CHANGE UP (ref 8.4–10.5)
CHLORIDE BLDV-SCNC: 101 MMOL/L — SIGNIFICANT CHANGE UP (ref 96–108)
CHLORIDE SERPL-SCNC: 103 MMOL/L — SIGNIFICANT CHANGE UP (ref 96–108)
CHLORIDE SERPL-SCNC: 106 MMOL/L — SIGNIFICANT CHANGE UP (ref 96–108)
CK MB BLD-MCNC: 3.3 % — SIGNIFICANT CHANGE UP (ref 0–3.5)
CK MB CFR SERPL CALC: 2.7 NG/ML — SIGNIFICANT CHANGE UP (ref 0–6.7)
CK SERPL-CCNC: 79 U/L — SIGNIFICANT CHANGE UP (ref 35–232)
CK SERPL-CCNC: 81 U/L — SIGNIFICANT CHANGE UP (ref 30–200)
CO2 BLDV-SCNC: 24 MMOL/L — SIGNIFICANT CHANGE UP (ref 22–26)
CO2 SERPL-SCNC: 19 MMOL/L — LOW (ref 22–31)
CO2 SERPL-SCNC: 24 MMOL/L — SIGNIFICANT CHANGE UP (ref 22–31)
CREAT SERPL-MCNC: 1.24 MG/DL — SIGNIFICANT CHANGE UP (ref 0.5–1.3)
CREAT SERPL-MCNC: 1.33 MG/DL — HIGH (ref 0.5–1.3)
EGFR: 59 ML/MIN/1.73M2 — LOW
EGFR: 64 ML/MIN/1.73M2 — SIGNIFICANT CHANGE UP
EOSINOPHIL # BLD AUTO: 0.02 K/UL — SIGNIFICANT CHANGE UP (ref 0–0.5)
EOSINOPHIL NFR BLD AUTO: 0.3 % — SIGNIFICANT CHANGE UP (ref 0–6)
GAS PNL BLDV: 119 MMOL/L — CRITICAL LOW (ref 136–145)
GAS PNL BLDV: SIGNIFICANT CHANGE UP
GLUCOSE BLDV-MCNC: 100 MG/DL — HIGH (ref 70–99)
GLUCOSE SERPL-MCNC: 107 MG/DL — HIGH (ref 70–99)
GLUCOSE SERPL-MCNC: 95 MG/DL — SIGNIFICANT CHANGE UP (ref 70–99)
HCO3 BLDV-SCNC: 22 MMOL/L — SIGNIFICANT CHANGE UP (ref 22–29)
HCT VFR BLD CALC: 33.6 % — LOW (ref 39–50)
HCT VFR BLD CALC: 35.3 % — LOW (ref 39–50)
HCT VFR BLDA CALC: 36 % — LOW (ref 39–51)
HGB BLD CALC-MCNC: 11.9 G/DL — LOW (ref 12.6–17.4)
HGB BLD-MCNC: 11 G/DL — LOW (ref 13–17)
HGB BLD-MCNC: 11.2 G/DL — LOW (ref 13–17)
IMM GRANULOCYTES NFR BLD AUTO: 0.3 % — SIGNIFICANT CHANGE UP (ref 0–1.5)
INR BLD: 1.12 RATIO — SIGNIFICANT CHANGE UP (ref 0.88–1.16)
LACTATE BLDV-MCNC: 2.4 MMOL/L — HIGH (ref 0.7–2)
LYMPHOCYTES # BLD AUTO: 1.16 K/UL — SIGNIFICANT CHANGE UP (ref 1–3.3)
LYMPHOCYTES # BLD AUTO: 17.2 % — SIGNIFICANT CHANGE UP (ref 13–44)
MCHC RBC-ENTMCNC: 27.9 PG — SIGNIFICANT CHANGE UP (ref 27–34)
MCHC RBC-ENTMCNC: 28.3 PG — SIGNIFICANT CHANGE UP (ref 27–34)
MCHC RBC-ENTMCNC: 31.7 GM/DL — LOW (ref 32–36)
MCHC RBC-ENTMCNC: 32.7 GM/DL — SIGNIFICANT CHANGE UP (ref 32–36)
MCV RBC AUTO: 86.4 FL — SIGNIFICANT CHANGE UP (ref 80–100)
MCV RBC AUTO: 88 FL — SIGNIFICANT CHANGE UP (ref 80–100)
MONOCYTES # BLD AUTO: 0.49 K/UL — SIGNIFICANT CHANGE UP (ref 0–0.9)
MONOCYTES NFR BLD AUTO: 7.3 % — SIGNIFICANT CHANGE UP (ref 2–14)
NEUTROPHILS # BLD AUTO: 5.02 K/UL — SIGNIFICANT CHANGE UP (ref 1.8–7.4)
NEUTROPHILS NFR BLD AUTO: 74.3 % — SIGNIFICANT CHANGE UP (ref 43–77)
NRBC # BLD: 0 /100 WBCS — SIGNIFICANT CHANGE UP (ref 0–0)
NRBC # BLD: 0 /100 WBCS — SIGNIFICANT CHANGE UP (ref 0–0)
NT-PROBNP SERPL-SCNC: 7115 PG/ML — HIGH (ref 0–300)
NT-PROBNP SERPL-SCNC: 8132 PG/ML — HIGH (ref 0–125)
PCO2 BLDV: 49 MMHG — SIGNIFICANT CHANGE UP (ref 42–55)
PH BLDV: 7.27 — LOW (ref 7.32–7.43)
PLATELET # BLD AUTO: 329 K/UL — SIGNIFICANT CHANGE UP (ref 150–400)
PLATELET # BLD AUTO: 342 K/UL — SIGNIFICANT CHANGE UP (ref 150–400)
PO2 BLDV: 16 MMHG — LOW (ref 25–45)
POTASSIUM BLDV-SCNC: >10 MMOL/L — CRITICAL HIGH (ref 3.5–5.1)
POTASSIUM SERPL-MCNC: 4.6 MMOL/L — SIGNIFICANT CHANGE UP (ref 3.5–5.3)
POTASSIUM SERPL-MCNC: 5.3 MMOL/L — SIGNIFICANT CHANGE UP (ref 3.5–5.3)
POTASSIUM SERPL-SCNC: 4.6 MMOL/L — SIGNIFICANT CHANGE UP (ref 3.5–5.3)
POTASSIUM SERPL-SCNC: 5.3 MMOL/L — SIGNIFICANT CHANGE UP (ref 3.5–5.3)
PROT SERPL-MCNC: 6.4 G/DL — SIGNIFICANT CHANGE UP (ref 6–8.3)
PROT SERPL-MCNC: 6.6 G/DL — SIGNIFICANT CHANGE UP (ref 6–8.3)
PROTHROM AB SERPL-ACNC: 13.4 SEC — SIGNIFICANT CHANGE UP (ref 10.5–13.4)
RBC # BLD: 3.89 M/UL — LOW (ref 4.2–5.8)
RBC # BLD: 4.01 M/UL — LOW (ref 4.2–5.8)
RBC # FLD: 14.9 % — HIGH (ref 10.3–14.5)
RBC # FLD: 15.1 % — HIGH (ref 10.3–14.5)
SAO2 % BLDV: 15 % — LOW (ref 67–88)
SARS-COV-2 RNA SPEC QL NAA+PROBE: SIGNIFICANT CHANGE UP
SODIUM SERPL-SCNC: 136 MMOL/L — SIGNIFICANT CHANGE UP (ref 135–145)
SODIUM SERPL-SCNC: 138 MMOL/L — SIGNIFICANT CHANGE UP (ref 135–145)
TROPONIN I, HIGH SENSITIVITY RESULT: 651.7 NG/L — HIGH
TROPONIN T, HIGH SENSITIVITY RESULT: 33 NG/L — SIGNIFICANT CHANGE UP (ref 0–51)
WBC # BLD: 6.59 K/UL — SIGNIFICANT CHANGE UP (ref 3.8–10.5)
WBC # BLD: 6.75 K/UL — SIGNIFICANT CHANGE UP (ref 3.8–10.5)
WBC # FLD AUTO: 6.59 K/UL — SIGNIFICANT CHANGE UP (ref 3.8–10.5)
WBC # FLD AUTO: 6.75 K/UL — SIGNIFICANT CHANGE UP (ref 3.8–10.5)

## 2022-04-05 PROCEDURE — 93005 ELECTROCARDIOGRAM TRACING: CPT

## 2022-04-05 PROCEDURE — 85025 COMPLETE CBC W/AUTO DIFF WBC: CPT

## 2022-04-05 PROCEDURE — 82550 ASSAY OF CK (CPK): CPT

## 2022-04-05 PROCEDURE — 85730 THROMBOPLASTIN TIME PARTIAL: CPT

## 2022-04-05 PROCEDURE — 83880 ASSAY OF NATRIURETIC PEPTIDE: CPT

## 2022-04-05 PROCEDURE — 80053 COMPREHEN METABOLIC PANEL: CPT

## 2022-04-05 PROCEDURE — 99285 EMERGENCY DEPT VISIT HI MDM: CPT | Mod: 25

## 2022-04-05 PROCEDURE — 87635 SARS-COV-2 COVID-19 AMP PRB: CPT

## 2022-04-05 PROCEDURE — 71045 X-RAY EXAM CHEST 1 VIEW: CPT | Mod: 26

## 2022-04-05 PROCEDURE — 84484 ASSAY OF TROPONIN QUANT: CPT

## 2022-04-05 PROCEDURE — 85610 PROTHROMBIN TIME: CPT

## 2022-04-05 PROCEDURE — 99291 CRITICAL CARE FIRST HOUR: CPT

## 2022-04-05 PROCEDURE — 36415 COLL VENOUS BLD VENIPUNCTURE: CPT

## 2022-04-05 PROCEDURE — 99285 EMERGENCY DEPT VISIT HI MDM: CPT

## 2022-04-05 PROCEDURE — 71045 X-RAY EXAM CHEST 1 VIEW: CPT

## 2022-04-05 RX ORDER — HEPARIN SODIUM 5000 [USP'U]/ML
5000 INJECTION INTRAVENOUS; SUBCUTANEOUS EVERY 6 HOURS
Refills: 0 | Status: DISCONTINUED | OUTPATIENT
Start: 2022-04-05 | End: 2022-04-09

## 2022-04-05 RX ORDER — HEPARIN SODIUM 5000 [USP'U]/ML
2500 INJECTION INTRAVENOUS; SUBCUTANEOUS EVERY 6 HOURS
Refills: 0 | Status: DISCONTINUED | OUTPATIENT
Start: 2022-04-05 | End: 2022-04-09

## 2022-04-05 RX ORDER — HEPARIN SODIUM 5000 [USP'U]/ML
INJECTION INTRAVENOUS; SUBCUTANEOUS
Qty: 25000 | Refills: 0 | Status: DISCONTINUED | OUTPATIENT
Start: 2022-04-05 | End: 2022-04-06

## 2022-04-05 RX ORDER — HEPARIN SODIUM 5000 [USP'U]/ML
INJECTION INTRAVENOUS; SUBCUTANEOUS
Qty: 25000 | Refills: 0 | Status: DISCONTINUED | OUTPATIENT
Start: 2022-04-05 | End: 2022-04-09

## 2022-04-05 RX ORDER — HEPARIN SODIUM 5000 [USP'U]/ML
5000 INJECTION INTRAVENOUS; SUBCUTANEOUS ONCE
Refills: 0 | Status: DISCONTINUED | OUTPATIENT
Start: 2022-04-05 | End: 2022-04-09

## 2022-04-05 RX ORDER — ASPIRIN/CALCIUM CARB/MAGNESIUM 324 MG
324 TABLET ORAL ONCE
Refills: 0 | Status: COMPLETED | OUTPATIENT
Start: 2022-04-05 | End: 2022-04-05

## 2022-04-05 RX ORDER — HEPARIN SODIUM 5000 [USP'U]/ML
3800 INJECTION INTRAVENOUS; SUBCUTANEOUS EVERY 6 HOURS
Refills: 0 | Status: DISCONTINUED | OUTPATIENT
Start: 2022-04-05 | End: 2022-04-06

## 2022-04-05 RX ADMIN — Medication 324 MILLIGRAM(S): at 21:26

## 2022-04-05 RX ADMIN — HEPARIN SODIUM UNIT(S)/HR: 5000 INJECTION INTRAVENOUS; SUBCUTANEOUS at 22:50

## 2022-04-05 RX ADMIN — HEPARIN SODIUM 750 UNIT(S)/HR: 5000 INJECTION INTRAVENOUS; SUBCUTANEOUS at 22:48

## 2022-04-05 NOTE — ED PROVIDER NOTE - TIMING
constant Preparation Of Recipient Site - Graft: The eschar was removed surgically with sharp dissection to facilitate appropriate survival of the following graft.

## 2022-04-05 NOTE — ED PROVIDER NOTE - PROGRESS NOTE DETAILS
Endorsed to Dr ZEYAD Velez MD, Facep Sam, PGY3 - Received pt as sign-out ~23:00. NSTEMI transfer from , was pending CTs as cardiology had requested CT head/chest/A/P & holding heparin due to pt's reported complaint of "hemoptysis" and also with abd pain. No acute pathology on CTs (nonspecific findings including pleural effusions, ground glass opacities, gb edema). Heparin was resumed after negative CTs resulted. ~03:14 pt began c/o CP again, repeat EKG unchanged. D/w cardiology to reevaluate pt, cards had requested repeat cardiac enzymes. Cards reevaluated & stated no acute intervention. Pt reevaluated, CP/abd pain improved. Hospitalist paged

## 2022-04-05 NOTE — ED PROVIDER NOTE - OBJECTIVE STATEMENT
67 year old male with a significant history of 2 heart attacks and CABG, currently on Brilinta, presenting to the ED with chief complaint of right sided chest pain and right sided neck pain for past 3 days. Patient also notes some dizziness and slight pain right now. Patient recalls that he took nitroglycerin last night. Patient says that he was picking up something heavy today and felt like falling so he came into the ER. NKDA.
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
## 2022-04-05 NOTE — ED PROVIDER NOTE - ATTENDING CONTRIBUTION TO CARE
67y male comes to ed as transfer from [place] ED. for nstemi, PMH Cabg, mi x2, PTCA w stents. PT had presented to ED for c/o sob/cp/dizziness/nv, Took ntg w mod improvement last night. Today had ekg w twi II,III,F V3-6 w trop elevation transfer for Cards eval. Has mild cp, PE WDWN male awake alert normocephalic atraumatic neck supple chest clear anterior & posterior cv no rubs, gallops or murmurs abd soft +bs no mass guarding neuro gcs 15 speech fluent power 5/5 all extr  Justin Velez MD, Facep

## 2022-04-05 NOTE — ED ADULT NURSE REASSESSMENT NOTE - NS ED NURSE REASSESS COMMENT FT1
Cardiology fellow at bedside. Fellow paused heparin at 2250 because pt admitted to coughing blood. ED Resident ARTUR Menchaca made aware and approved decision. Pt states he has had chills, cough x 2 weeks and "has been having a cold" and taking Robitussin.

## 2022-04-05 NOTE — ED PROVIDER NOTE - INTERPRETATION
deep T Patient is well appearing and afebrile with vital signs within normal limits. Inversion V2, V3, V4, V5, V6.   AVF V2 and V3, normal sinus rhythm, possible left atrial enlargement, left ventricular hypertrophy with repolarization abnormality, prolonged qt, abnormal ecg/abnormal

## 2022-04-05 NOTE — ED PROVIDER NOTE - CLINICAL SUMMARY MEDICAL DECISION MAKING FREE TEXT BOX
68 y/o M PMH Cabg, mi x2, PTCA w stents transferred from [place] ED for NSTEMI. Ekg w twi II,III,F V3-6 w trop elevation at OSH and transfer for Cards eval. Labs, ekg, cards. Will admit.

## 2022-04-05 NOTE — ED PROVIDER NOTE - PHYSICAL EXAMINATION
Patient is well appearing  Pointed to right upper chest for pain Patient is well appearing  Pointed to right upper chest as site of pain

## 2022-04-05 NOTE — ED PROVIDER NOTE - NS ED ROS FT
General: no fever, chills  HENT: no nasal congestion, no sore throat  Eyes: no visual changes, no blurred vision  Neck: no neck pain  CV: +chest pain, no palpitations  Resp: no difficulty breathing, no cough  Abdominal: no nausea, no vomiting, no diarrhea, +abdominal pain  MSK: no muscle aches  Neuro: no headaches  Skin: no rashes

## 2022-04-05 NOTE — ED PROVIDER NOTE - PROGRESS NOTE DETAILS
Elevated troponin, ongoing chest pain. Transfer for interventional cardiology evaluation. patient took his brilinta this morning.
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
## 2022-04-05 NOTE — ED ADULT NURSE NOTE - OBJECTIVE STATEMENT
Pt 67 year old male, A/O x4. Pt transferred from [place] [facility] due to NSTEMI. PMH- MI x2, CABG (on Brilinta and aspirin). As per pt, has had 3 days of generalized cheat pain radiating to back with DOMINGUEZ/ SOB. Pm of 4/4/22, pain became more severe with dizziness and took nitroglycerin. Pt states "I went to bed, called EMS, I didn't realize I went to bed, I passed out". Pt brought to [facility] where they saw elevation in troponin and BNP. Heparin bolus given at [facility], and heparin infusion started per EMS at approx 2138 11ml/hr following full anti coag nomogram. Upon assessment, pt well appearing, 99% on RA, complaining of SOB and dizziness. Heparin adjusted to ACS nomogram and heparin adjusted to 7.5ml/hr. Skin- warm, dry, intact. Abd. soft, nontender, nondistended. Denies fever, chills, N/V/D, numbness/tingling.

## 2022-04-05 NOTE — ED PROVIDER NOTE - PHYSICAL EXAMINATION
General: elderly appearing   HEENT: neck supple, anicteric sclera  Cardiovascular: Normal s1, s2, RRR  Respiratory: CTA b/l   Abdominal: Soft, ntnd  Extremities: No swelling in LEs  Neurologic: Non focal  Psych: Awake, alert answering questions appropriately

## 2022-04-05 NOTE — ED PROVIDER NOTE - OBJECTIVE STATEMENT
68 y/o M PMH Cabg, mi x2, PTCA w stents transferred from [place] ED for NSTEMI. Patient initially presented to ED for c/o sob/cp/dizziness/nv. Ekg w twi II,III,F V3-6 w trop elevation at OSH and transfer for Cards eval.

## 2022-04-05 NOTE — ED PROVIDER NOTE - CLINICAL SUMMARY MEDICAL DECISION MAKING FREE TEXT BOX
67 year old male with PMHx of 2 heart attacks and CABG presents to the er with chest pain. Abnormal EKG. Labs and admit.

## 2022-04-05 NOTE — ED PROVIDER NOTE - ENMT, MLM
Airway patent, Nasal mucosa clear. Mouth with normal mucosa. Throat has no vesicles, no oropharyngeal exudates and uvula is midline.
I, José Miguel Polo, performed a face to face bedside interview with this patient regarding history of present illness, review of symptoms and relevant past medical, social and family history.  I completed an independent physical examination. I have communicated the patient’s plan of care and disposition with the ACP.      26 year old female who presents c/o lower abdominal pain.  pt with history ovarian cyst; pe awake alert in mild distress;  heent ncat neck supple cor s1s2 lungs clear abd mild suprapubic tenderness    dx menstruation

## 2022-04-06 DIAGNOSIS — R07.9 CHEST PAIN, UNSPECIFIED: ICD-10-CM

## 2022-04-06 LAB
APTT BLD: 36.9 SEC — HIGH (ref 27.5–35.5)
BASE EXCESS BLDV CALC-SCNC: -3 MMOL/L — LOW (ref -2–2)
CA-I SERPL-SCNC: 1.18 MMOL/L — SIGNIFICANT CHANGE UP (ref 1.15–1.33)
CHLORIDE BLDV-SCNC: 102 MMOL/L — SIGNIFICANT CHANGE UP (ref 96–108)
CK MB BLD-MCNC: 3.7 % — HIGH (ref 0–3.5)
CK MB CFR SERPL CALC: 2.3 NG/ML — SIGNIFICANT CHANGE UP (ref 0–6.7)
CK SERPL-CCNC: 62 U/L — SIGNIFICANT CHANGE UP (ref 30–200)
CO2 BLDV-SCNC: 24 MMOL/L — SIGNIFICANT CHANGE UP (ref 22–26)
GAS PNL BLDV: 130 MMOL/L — LOW (ref 136–145)
GAS PNL BLDV: SIGNIFICANT CHANGE UP
GAS PNL BLDV: SIGNIFICANT CHANGE UP
GLUCOSE BLDV-MCNC: 84 MG/DL — SIGNIFICANT CHANGE UP (ref 70–99)
HCO3 BLDV-SCNC: 23 MMOL/L — SIGNIFICANT CHANGE UP (ref 22–29)
HCT VFR BLD CALC: 34.1 % — LOW (ref 39–50)
HCT VFR BLD CALC: 35.2 % — LOW (ref 39–50)
HCT VFR BLDA CALC: 34 % — LOW (ref 39–51)
HGB BLD CALC-MCNC: 11.2 G/DL — LOW (ref 12.6–17.4)
HGB BLD-MCNC: 10.9 G/DL — LOW (ref 13–17)
HGB BLD-MCNC: 11.3 G/DL — LOW (ref 13–17)
INR BLD: 1.27 RATIO — HIGH (ref 0.88–1.16)
LACTATE BLDV-MCNC: 1.7 MMOL/L — SIGNIFICANT CHANGE UP (ref 0.7–2)
MCHC RBC-ENTMCNC: 27.9 PG — SIGNIFICANT CHANGE UP (ref 27–34)
MCHC RBC-ENTMCNC: 27.9 PG — SIGNIFICANT CHANGE UP (ref 27–34)
MCHC RBC-ENTMCNC: 32 GM/DL — SIGNIFICANT CHANGE UP (ref 32–36)
MCHC RBC-ENTMCNC: 32.1 GM/DL — SIGNIFICANT CHANGE UP (ref 32–36)
MCV RBC AUTO: 86.9 FL — SIGNIFICANT CHANGE UP (ref 80–100)
MCV RBC AUTO: 87.4 FL — SIGNIFICANT CHANGE UP (ref 80–100)
NRBC # BLD: 0 /100 WBCS — SIGNIFICANT CHANGE UP (ref 0–0)
NRBC # BLD: 0 /100 WBCS — SIGNIFICANT CHANGE UP (ref 0–0)
OB PNL STL: NEGATIVE — SIGNIFICANT CHANGE UP
PCO2 BLDV: 42 MMHG — SIGNIFICANT CHANGE UP (ref 42–55)
PH BLDV: 7.34 — SIGNIFICANT CHANGE UP (ref 7.32–7.43)
PLATELET # BLD AUTO: 306 K/UL — SIGNIFICANT CHANGE UP (ref 150–400)
PLATELET # BLD AUTO: 336 K/UL — SIGNIFICANT CHANGE UP (ref 150–400)
PO2 BLDV: 38 MMHG — SIGNIFICANT CHANGE UP (ref 25–45)
POTASSIUM BLDV-SCNC: 5.5 MMOL/L — HIGH (ref 3.5–5.1)
PROTHROM AB SERPL-ACNC: 14.8 SEC — HIGH (ref 10.5–13.4)
RBC # BLD: 3.9 M/UL — LOW (ref 4.2–5.8)
RBC # BLD: 4.05 M/UL — LOW (ref 4.2–5.8)
RBC # FLD: 15 % — HIGH (ref 10.3–14.5)
RBC # FLD: 15.1 % — HIGH (ref 10.3–14.5)
SAO2 % BLDV: 55.4 % — LOW (ref 67–88)
TROPONIN T, HIGH SENSITIVITY RESULT: 33 NG/L — SIGNIFICANT CHANGE UP (ref 0–51)
TROPONIN T, HIGH SENSITIVITY RESULT: 38 NG/L — SIGNIFICANT CHANGE UP (ref 0–51)
WBC # BLD: 5.58 K/UL — SIGNIFICANT CHANGE UP (ref 3.8–10.5)
WBC # BLD: 5.84 K/UL — SIGNIFICANT CHANGE UP (ref 3.8–10.5)
WBC # FLD AUTO: 5.58 K/UL — SIGNIFICANT CHANGE UP (ref 3.8–10.5)
WBC # FLD AUTO: 5.84 K/UL — SIGNIFICANT CHANGE UP (ref 3.8–10.5)

## 2022-04-06 PROCEDURE — 99233 SBSQ HOSP IP/OBS HIGH 50: CPT

## 2022-04-06 PROCEDURE — G1004: CPT

## 2022-04-06 PROCEDURE — 93308 TTE F-UP OR LMTD: CPT | Mod: 26

## 2022-04-06 PROCEDURE — 99223 1ST HOSP IP/OBS HIGH 75: CPT

## 2022-04-06 PROCEDURE — 74177 CT ABD & PELVIS W/CONTRAST: CPT | Mod: 26,MA

## 2022-04-06 PROCEDURE — 76705 ECHO EXAM OF ABDOMEN: CPT | Mod: 26

## 2022-04-06 PROCEDURE — 70450 CT HEAD/BRAIN W/O DYE: CPT | Mod: 26,MG

## 2022-04-06 PROCEDURE — 71260 CT THORAX DX C+: CPT | Mod: 26,MA

## 2022-04-06 PROCEDURE — 72125 CT NECK SPINE W/O DYE: CPT | Mod: 26,MA

## 2022-04-06 PROCEDURE — 93306 TTE W/DOPPLER COMPLETE: CPT | Mod: 26

## 2022-04-06 RX ORDER — GABAPENTIN 400 MG/1
100 CAPSULE ORAL THREE TIMES A DAY
Refills: 0 | Status: DISCONTINUED | OUTPATIENT
Start: 2022-04-06 | End: 2022-04-12

## 2022-04-06 RX ORDER — HEPARIN SODIUM 5000 [USP'U]/ML
INJECTION INTRAVENOUS; SUBCUTANEOUS
Qty: 25000 | Refills: 0 | Status: DISCONTINUED | OUTPATIENT
Start: 2022-04-06 | End: 2022-04-06

## 2022-04-06 RX ORDER — CYCLOBENZAPRINE HYDROCHLORIDE 10 MG/1
10 TABLET, FILM COATED ORAL THREE TIMES A DAY
Refills: 0 | Status: DISCONTINUED | OUTPATIENT
Start: 2022-04-06 | End: 2022-04-12

## 2022-04-06 RX ORDER — PANTOPRAZOLE SODIUM 20 MG/1
40 TABLET, DELAYED RELEASE ORAL
Refills: 0 | Status: DISCONTINUED | OUTPATIENT
Start: 2022-04-06 | End: 2022-04-12

## 2022-04-06 RX ORDER — DULOXETINE HYDROCHLORIDE 30 MG/1
30 CAPSULE, DELAYED RELEASE ORAL DAILY
Refills: 0 | Status: DISCONTINUED | OUTPATIENT
Start: 2022-04-06 | End: 2022-04-12

## 2022-04-06 RX ORDER — TICAGRELOR 90 MG/1
90 TABLET ORAL
Refills: 0 | Status: DISCONTINUED | OUTPATIENT
Start: 2022-04-06 | End: 2022-04-12

## 2022-04-06 RX ORDER — METOPROLOL TARTRATE 50 MG
50 TABLET ORAL
Refills: 0 | Status: DISCONTINUED | OUTPATIENT
Start: 2022-04-06 | End: 2022-04-08

## 2022-04-06 RX ORDER — CYCLOBENZAPRINE HYDROCHLORIDE 10 MG/1
10 TABLET, FILM COATED ORAL AT BEDTIME
Refills: 0 | Status: DISCONTINUED | OUTPATIENT
Start: 2022-04-06 | End: 2022-04-06

## 2022-04-06 RX ORDER — FUROSEMIDE 40 MG
40 TABLET ORAL DAILY
Refills: 0 | Status: DISCONTINUED | OUTPATIENT
Start: 2022-04-06 | End: 2022-04-08

## 2022-04-06 RX ORDER — ASPIRIN/CALCIUM CARB/MAGNESIUM 324 MG
81 TABLET ORAL DAILY
Refills: 0 | Status: DISCONTINUED | OUTPATIENT
Start: 2022-04-06 | End: 2022-04-13

## 2022-04-06 RX ORDER — AMLODIPINE BESYLATE 2.5 MG/1
10 TABLET ORAL DAILY
Refills: 0 | Status: DISCONTINUED | OUTPATIENT
Start: 2022-04-06 | End: 2022-04-07

## 2022-04-06 RX ORDER — MORPHINE SULFATE 50 MG/1
4 CAPSULE, EXTENDED RELEASE ORAL ONCE
Refills: 0 | Status: DISCONTINUED | OUTPATIENT
Start: 2022-04-06 | End: 2022-04-06

## 2022-04-06 RX ORDER — HEPARIN SODIUM 5000 [USP'U]/ML
3800 INJECTION INTRAVENOUS; SUBCUTANEOUS EVERY 6 HOURS
Refills: 0 | Status: DISCONTINUED | OUTPATIENT
Start: 2022-04-06 | End: 2022-04-06

## 2022-04-06 RX ORDER — FUROSEMIDE 40 MG
40 TABLET ORAL ONCE
Refills: 0 | Status: COMPLETED | OUTPATIENT
Start: 2022-04-06 | End: 2022-04-06

## 2022-04-06 RX ORDER — ATORVASTATIN CALCIUM 80 MG/1
80 TABLET, FILM COATED ORAL AT BEDTIME
Refills: 0 | Status: DISCONTINUED | OUTPATIENT
Start: 2022-04-06 | End: 2022-04-13

## 2022-04-06 RX ADMIN — MORPHINE SULFATE 4 MILLIGRAM(S): 50 CAPSULE, EXTENDED RELEASE ORAL at 04:31

## 2022-04-06 RX ADMIN — Medication 81 MILLIGRAM(S): at 17:48

## 2022-04-06 RX ADMIN — HEPARIN SODIUM 750 UNIT(S)/HR: 5000 INJECTION INTRAVENOUS; SUBCUTANEOUS at 05:14

## 2022-04-06 RX ADMIN — MORPHINE SULFATE 4 MILLIGRAM(S): 50 CAPSULE, EXTENDED RELEASE ORAL at 03:20

## 2022-04-06 RX ADMIN — Medication 50 MILLIGRAM(S): at 08:51

## 2022-04-06 RX ADMIN — Medication 40 MILLIGRAM(S): at 17:41

## 2022-04-06 RX ADMIN — TICAGRELOR 90 MILLIGRAM(S): 90 TABLET ORAL at 17:42

## 2022-04-06 RX ADMIN — ATORVASTATIN CALCIUM 80 MILLIGRAM(S): 80 TABLET, FILM COATED ORAL at 22:42

## 2022-04-06 RX ADMIN — CYCLOBENZAPRINE HYDROCHLORIDE 10 MILLIGRAM(S): 10 TABLET, FILM COATED ORAL at 22:43

## 2022-04-06 RX ADMIN — Medication 50 MILLIGRAM(S): at 17:41

## 2022-04-06 RX ADMIN — GABAPENTIN 100 MILLIGRAM(S): 400 CAPSULE ORAL at 22:42

## 2022-04-06 RX ADMIN — HEPARIN SODIUM UNIT(S)/HR: 5000 INJECTION INTRAVENOUS; SUBCUTANEOUS at 08:40

## 2022-04-06 RX ADMIN — GABAPENTIN 100 MILLIGRAM(S): 400 CAPSULE ORAL at 17:41

## 2022-04-06 NOTE — CONSULT NOTE ADULT - NS PANP COMMENT GEN_ALL_CORE FT
CHF, CAD, pulmonary edema, h/o etoh use, now with n/v and abdominal pain  pt clinically improved tolerating po  anemia, guaiac negative    plan ppi daily       monitor h/h

## 2022-04-06 NOTE — ED ADULT NURSE REASSESSMENT NOTE - NS ED NURSE REASSESS COMMENT FT1
Spoke with MD regarding COVID swab order d/t pt having COVID swab at [facility] before transfer. COVID swab not obtained as per MD swab at [facility] prior to transfer is sufficient.

## 2022-04-06 NOTE — ED ADULT NURSE REASSESSMENT NOTE - NS ED NURSE REASSESS COMMENT FT1
received report from BEVERLEY Donaldson. pt is resting in stretcher with side rails up for safety and call bell in reach, comfortable and using iPad currently. pt is A&Ox3, VSS, no complaints at this time. heparin infusion running at 7.5mL/hr. NAD noted at this time.
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
## 2022-04-06 NOTE — PATIENT PROFILE ADULT - FALL HARM RISK - RISK INTERVENTIONS

## 2022-04-06 NOTE — H&P ADULT - NSHPLABSRESULTS_GEN_ALL_CORE
LABS:                        11.3   5.84  )-----------( 336      ( 06 Apr 2022 11:47 )             35.2     04-05    136  |  103  |  21  ----------------------------<  107<H>  5.3   |  19<L>  |  1.24    Ca    9.3      05 Apr 2022 22:31    TPro  6.4  /  Alb  3.7  /  TBili  0.9  /  DBili  x   /  AST  41<H>  /  ALT  25  /  AlkPhos  124<H>  04-05    PT/INR - ( 06 Apr 2022 03:51 )   PT: 14.8 sec;   INR: 1.27 ratio         PTT - ( 06 Apr 2022 08:58 )  PTT:45.4 sec  CARDIAC MARKERS ( 06 Apr 2022 05:33 )  x     / x     / 62 U/L / x     / 2.3 ng/mL  CARDIAC MARKERS ( 05 Apr 2022 22:31 )  x     / x     / 81 U/L / x     / 2.7 ng/mL            04-06 @ 02:24  5.5  38
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
## 2022-04-06 NOTE — H&P ADULT - NSHPPHYSICALEXAM_GEN_ALL_CORE
PHYSICAL EXAMINATION:  Vital Signs Last 24 Hrs  T(C): 36.7 (06 Apr 2022 11:57), Max: 36.9 (06 Apr 2022 07:15)  T(F): 98.1 (06 Apr 2022 11:57), Max: 98.4 (06 Apr 2022 07:15)  HR: 74 (06 Apr 2022 11:57) (67 - 78)  BP: 130/69 (06 Apr 2022 11:57) (115/69 - 145/82)  BP(mean): 84 (06 Apr 2022 11:57) (81 - 98)  RR: 20 (06 Apr 2022 11:57) (19 - 22)  SpO2: 97% (06 Apr 2022 11:57) (89% - 99%)  CAPILLARY BLOOD GLUCOSE            GENERAL: NAD, well-groomed,  HEAD:  atraumatic, normocephalic  EYES: sclera anicteric  ENMT: mucous membranes moist  NECK: supple, No JVD  CHEST/LUNG: clear to auscultation bilaterally;    no      rales   ,   no rhonchi,   HEART: normal S1, S2  ABDOMEN: BS+, soft, ND, NT   EXTREMITIES:    no    edema    b/l LEs  NEURO: awake, ,      SKIN: no     rash
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
## 2022-04-07 PROBLEM — Z95.1 PRESENCE OF AORTOCORONARY BYPASS GRAFT: Chronic | Status: ACTIVE | Noted: 2022-04-05

## 2022-04-07 PROBLEM — I21.9 ACUTE MYOCARDIAL INFARCTION, UNSPECIFIED: Chronic | Status: ACTIVE | Noted: 2022-04-05

## 2022-04-07 LAB
ALBUMIN SERPL ELPH-MCNC: 3.8 G/DL — SIGNIFICANT CHANGE UP (ref 3.3–5)
ALP SERPL-CCNC: 136 U/L — HIGH (ref 40–120)
ALT FLD-CCNC: 34 U/L — SIGNIFICANT CHANGE UP (ref 10–45)
ANION GAP SERPL CALC-SCNC: 14 MMOL/L — SIGNIFICANT CHANGE UP (ref 5–17)
AST SERPL-CCNC: 40 U/L — SIGNIFICANT CHANGE UP (ref 10–40)
BILIRUB DIRECT SERPL-MCNC: 0.2 MG/DL — SIGNIFICANT CHANGE UP (ref 0–0.3)
BILIRUB INDIRECT FLD-MCNC: 0.8 MG/DL — SIGNIFICANT CHANGE UP (ref 0.2–1)
BILIRUB SERPL-MCNC: 1 MG/DL — SIGNIFICANT CHANGE UP (ref 0.2–1.2)
BUN SERPL-MCNC: 16 MG/DL — SIGNIFICANT CHANGE UP (ref 7–23)
CALCIUM SERPL-MCNC: 9.2 MG/DL — SIGNIFICANT CHANGE UP (ref 8.4–10.5)
CHLORIDE SERPL-SCNC: 99 MMOL/L — SIGNIFICANT CHANGE UP (ref 96–108)
CO2 SERPL-SCNC: 26 MMOL/L — SIGNIFICANT CHANGE UP (ref 22–31)
CREAT SERPL-MCNC: 1.15 MG/DL — SIGNIFICANT CHANGE UP (ref 0.5–1.3)
EGFR: 70 ML/MIN/1.73M2 — SIGNIFICANT CHANGE UP
GLUCOSE SERPL-MCNC: 152 MG/DL — HIGH (ref 70–99)
HCT VFR BLD CALC: 35.8 % — LOW (ref 39–50)
HCV AB S/CO SERPL IA: 0.09 S/CO — SIGNIFICANT CHANGE UP (ref 0–0.99)
HCV AB SERPL-IMP: SIGNIFICANT CHANGE UP
HGB BLD-MCNC: 11.7 G/DL — LOW (ref 13–17)
MAGNESIUM SERPL-MCNC: 1.7 MG/DL — SIGNIFICANT CHANGE UP (ref 1.6–2.6)
MCHC RBC-ENTMCNC: 27.9 PG — SIGNIFICANT CHANGE UP (ref 27–34)
MCHC RBC-ENTMCNC: 32.7 GM/DL — SIGNIFICANT CHANGE UP (ref 32–36)
MCV RBC AUTO: 85.4 FL — SIGNIFICANT CHANGE UP (ref 80–100)
NRBC # BLD: 0 /100 WBCS — SIGNIFICANT CHANGE UP (ref 0–0)
PHOSPHATE SERPL-MCNC: 3.1 MG/DL — SIGNIFICANT CHANGE UP (ref 2.5–4.5)
PLATELET # BLD AUTO: 348 K/UL — SIGNIFICANT CHANGE UP (ref 150–400)
POTASSIUM SERPL-MCNC: 3.7 MMOL/L — SIGNIFICANT CHANGE UP (ref 3.5–5.3)
POTASSIUM SERPL-SCNC: 3.7 MMOL/L — SIGNIFICANT CHANGE UP (ref 3.5–5.3)
PROT SERPL-MCNC: 6.6 G/DL — SIGNIFICANT CHANGE UP (ref 6–8.3)
RBC # BLD: 4.19 M/UL — LOW (ref 4.2–5.8)
RBC # FLD: 14.8 % — HIGH (ref 10.3–14.5)
SODIUM SERPL-SCNC: 139 MMOL/L — SIGNIFICANT CHANGE UP (ref 135–145)
WBC # BLD: 6.88 K/UL — SIGNIFICANT CHANGE UP (ref 3.8–10.5)
WBC # FLD AUTO: 6.88 K/UL — SIGNIFICANT CHANGE UP (ref 3.8–10.5)

## 2022-04-07 PROCEDURE — 93970 EXTREMITY STUDY: CPT | Mod: 26

## 2022-04-07 PROCEDURE — 71275 CT ANGIOGRAPHY CHEST: CPT | Mod: 26

## 2022-04-07 PROCEDURE — 99223 1ST HOSP IP/OBS HIGH 75: CPT | Mod: GC

## 2022-04-07 RX ORDER — ACETAMINOPHEN 500 MG
650 TABLET ORAL EVERY 6 HOURS
Refills: 0 | Status: DISCONTINUED | OUTPATIENT
Start: 2022-04-07 | End: 2022-04-13

## 2022-04-07 RX ORDER — LOSARTAN POTASSIUM 100 MG/1
25 TABLET, FILM COATED ORAL DAILY
Refills: 0 | Status: DISCONTINUED | OUTPATIENT
Start: 2022-04-07 | End: 2022-04-08

## 2022-04-07 RX ORDER — ACETAMINOPHEN 500 MG
650 TABLET ORAL ONCE
Refills: 0 | Status: COMPLETED | OUTPATIENT
Start: 2022-04-07 | End: 2022-04-07

## 2022-04-07 RX ORDER — MAGNESIUM OXIDE 400 MG ORAL TABLET 241.3 MG
800 TABLET ORAL ONCE
Refills: 0 | Status: COMPLETED | OUTPATIENT
Start: 2022-04-07 | End: 2022-04-07

## 2022-04-07 RX ADMIN — GABAPENTIN 100 MILLIGRAM(S): 400 CAPSULE ORAL at 22:26

## 2022-04-07 RX ADMIN — TICAGRELOR 90 MILLIGRAM(S): 90 TABLET ORAL at 05:29

## 2022-04-07 RX ADMIN — Medication 81 MILLIGRAM(S): at 12:21

## 2022-04-07 RX ADMIN — MAGNESIUM OXIDE 400 MG ORAL TABLET 800 MILLIGRAM(S): 241.3 TABLET ORAL at 22:26

## 2022-04-07 RX ADMIN — Medication 650 MILLIGRAM(S): at 08:53

## 2022-04-07 RX ADMIN — PANTOPRAZOLE SODIUM 40 MILLIGRAM(S): 20 TABLET, DELAYED RELEASE ORAL at 05:29

## 2022-04-07 RX ADMIN — Medication 50 MILLIGRAM(S): at 17:44

## 2022-04-07 RX ADMIN — Medication 650 MILLIGRAM(S): at 08:08

## 2022-04-07 RX ADMIN — Medication 50 MILLIGRAM(S): at 05:29

## 2022-04-07 RX ADMIN — ATORVASTATIN CALCIUM 80 MILLIGRAM(S): 80 TABLET, FILM COATED ORAL at 22:26

## 2022-04-07 RX ADMIN — TICAGRELOR 90 MILLIGRAM(S): 90 TABLET ORAL at 17:45

## 2022-04-07 RX ADMIN — DULOXETINE HYDROCHLORIDE 30 MILLIGRAM(S): 30 CAPSULE, DELAYED RELEASE ORAL at 12:22

## 2022-04-07 RX ADMIN — AMLODIPINE BESYLATE 10 MILLIGRAM(S): 2.5 TABLET ORAL at 05:30

## 2022-04-07 RX ADMIN — CYCLOBENZAPRINE HYDROCHLORIDE 10 MILLIGRAM(S): 10 TABLET, FILM COATED ORAL at 22:26

## 2022-04-07 RX ADMIN — GABAPENTIN 100 MILLIGRAM(S): 400 CAPSULE ORAL at 05:28

## 2022-04-07 RX ADMIN — Medication 40 MILLIGRAM(S): at 05:30

## 2022-04-07 RX ADMIN — CYCLOBENZAPRINE HYDROCHLORIDE 10 MILLIGRAM(S): 10 TABLET, FILM COATED ORAL at 05:30

## 2022-04-07 NOTE — PHYSICAL THERAPY INITIAL EVALUATION ADULT - ADDITIONAL COMMENTS
Pt lives alone in an apartment with no steps to negotiate, ramp access. Pt was (I) with ADLs. Stated in the house he ambulates with a cane, in the community uses rollator or motorized WC. has grab bars in the shower and by the toilet.
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
## 2022-04-07 NOTE — CONSULT NOTE ADULT - ASSESSMENT
67M  Hx of CAD, CABG 2011, hx of cardiac stents 2020,, active smoker, and etoh use (w/ hx of w/d) transferred here from OSH for NSTEMI evaluation. Patient underwent a CTA Chest which showed emphysema, interlobular septal thickening, ground glass opacities, pleural effusions, and atelectasis. POCUS by ed suggests interstitial disease pattern with pleural effusions and 1x1.6 cm R paratracheal LN and 1.2x1.8 cm AP window LN. Formal TTE with moderate to severe MR, severely dilated LA, Globally LV systolic dysfx, flattening of IVS in systole/diastole, severe diastolic dysfx, RV enlargement with decreased RVSF, and estimate spap of 57. Dyspnea with exertion noted by patient but no other complaints. Pulmonary called for mediastinal LNs.    #Decompensated L/R sided systolic heart failure  #Pulmonary edema  #Pulmonary hypertension   #Smoking history  #Emphysema  #Atelectasis  #Pleural effusions  #Mediastinal lymphadenopathy    Dr. Cristopher Mansfield, DO  Pulmonary and Critical Care Medicine Fellow   Available via Microsoft Teams - **Preferred**  Pulmonary Spectra #[id number] ([facility]) / [phone] ([facility])  Pager:  [phone] 67M  Hx of CAD, CABG 2011, hx of cardiac stents 2020, prior smoker, and etoh use (w/ hx of w/d) transferred here from OSH for NSTEMI evaluation. Patient underwent a CTA Chest which showed emphysema, interlobular septal thickening, ground glass opacities, pleural effusions, and atelectasis. POCUS by ed suggests interstitial disease pattern with pleural effusions and 1x1.6 cm R paratracheal LN and 1.2x1.8 cm AP window LN. Formal TTE with moderate to severe MR, severely dilated LA, Globally LV systolic dysfx, flattening of IVS in systole/diastole, severe diastolic dysfx, RV enlargement with decreased RVSF, and estimate spap of 57. Dyspnea with exertion noted by patient but no other complaints. Pulmonary called for mediastinal LNs.    #Decompensated L/R sided systolic heart failure  #Pulmonary edema  #Pulmonary hypertension   #Smoking history  #Emphysema  #Atelectasis  #Pleural effusions  #Mediastinal lymphadenopathy    Recommendations             Dr. Cristopher Mansfield, DO  Pulmonary and Critical Care Medicine Fellow   Available via Microsoft Teams - **Preferred**  Pulmonary Spectra #[id number] ([facility]) / [phone] ([facility])  Pager:  [phone] 67M  Hx of CAD, CABG 2011, hx of cardiac stents 2020, prior smoker, and etoh use (w/ hx of w/d) transferred here from OSH for NSTEMI evaluation. Patient underwent a CTA Chest which showed emphysema, interlobular septal thickening, ground glass opacities, pleural effusions, and atelectasis. POCUS by ed suggests interstitial disease pattern with pleural effusions and 1x1.6 cm R paratracheal LN and 1.2x1.8 cm AP window LN. Formal TTE with moderate to severe MR, severely dilated LA, Globally LV systolic dysfx, flattening of IVS in systole/diastole, severe diastolic dysfx, RV enlargement with decreased RVSF, and estimate spap of 57. Dyspnea with exertion noted by patient but no other complaints. Pulmonary called for mediastinal LNs.    #Decompensated L/R sided systolic heart failure  #Pulmonary edema  #Pulmonary hypertension   #Smoking history  #Emphysema  #Atelectasis  #Pleural effusions  #Mediastinal lymphadenopathy    Recommendations  -LN enlargement likely from lymphatic congestion from HF.  -HF mgmt and iuresis per primary team and cardiology  -Repeat CT chest outpt in 4-6 weeks.    Pulmonary to sign off.    Dr. Cristopher Mansfield, DO  Pulmonary and Critical Care Medicine Fellow   Available via Microsoft Teams - **Preferred**  Pulmonary Spectra #[id number] ([facility]) / [phone] ([facility])  Pager:  [phone]          Dr. Cristopher Mansfield,   Pulmonary and Critical Care Medicine Fellow   Available via Microsoft Teams - **Preferred**  Pulmonary Spectra #[id number] ([facility]) / [phone] ([facility])  Pager:  [phone]
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
## 2022-04-07 NOTE — CONSULT NOTE ADULT - SUBJECTIVE AND OBJECTIVE BOX
PULMONARY SERVICE INITIAL CONSULT NOTE    HPI:  67M  Hx of CAD, CABG 2011, hx of cardiac stents 2020,, active smoker, and etoh use (w/ hx of w/d) transferred here from OSH for NSTEMI evaluation. Patient underwent a CTA Chest which showed emphysema, interlobular septal thickening, ground glass opacities, pleural effusions, and atelectasis. POCUS by ed suggests interstitial disease pattern with pleural effusions and 1x1.6 cm R paratracheal LN and 1.2x1.8 cm AP window LN. Formal TTE with moderate to severe MR, severely dilated LA, Globally LV systolic dysfx, flattening of IVS in systole/diastole, severe diastolic dysfx, RV enlargement with decreased RVSF, and estimate spap of 57. Dyspnea with exertion noted by patient but no other complaints. Pulmonary called for mediastinal LNs.    REVIEW OF SYSTEMS:  All additional ROS negative.    PAST MEDICAL & SURGICAL HISTORY:  Leg weakness        FAMILY HISTORY:      SOCIAL HISTORY:  Smoking Status: [ ] Current, [ ] Former, [ ] Never  Pack Years:    MEDICATIONS:  Pulmonary:    Antimicrobials:    Anticoagulants:  aspirin enteric coated 81 milliGRAM(s) Oral daily  ticagrelor 90 milliGRAM(s) Oral two times a day    Onc:    GI/:  pantoprazole    Tablet 40 milliGRAM(s) Oral before breakfast    Endocrine:  atorvastatin 80 milliGRAM(s) Oral at bedtime    Cardiac:  furosemide   Injectable 40 milliGRAM(s) IV Push daily  losartan 25 milliGRAM(s) Oral daily  metoprolol tartrate 50 milliGRAM(s) Oral two times a day    Other Medications:  cyclobenzaprine 10 milliGRAM(s) Oral three times a day  DULoxetine 30 milliGRAM(s) Oral daily  gabapentin 100 milliGRAM(s) Oral three times a day      Allergies    No Known Allergies    Intolerances        PHYSICAL EXAM  Vital Signs Last 24 Hrs  T(C): 36.3 (07 Apr 2022 04:05), Max: 37.1 (06 Apr 2022 12:33)  T(F): 97.3 (07 Apr 2022 04:05), Max: 98.7 (06 Apr 2022 12:33)  HR: 74 (07 Apr 2022 04:05) (72 - 88)  BP: 124/74 (07 Apr 2022 04:05) (124/74 - 151/71)  BP(mean): 84 (06 Apr 2022 11:57) (84 - 84)  RR: 20 (07 Apr 2022 04:05) (20 - 20)  SpO2: 100% (06 Apr 2022 20:14) (89% - 100%)    04-06 @ 07:01  -  04-07 @ 07:00  --------------------------------------------------------  IN: 0 mL / OUT: 3150 mL / NET: -3150 mL          CONSTITUTIONAL: No acute distress.   HEENT:  Conjunctiva clear B/L.  Moist oral mucosa.   Cardiovascular: RRR with no murmurs. No JVD noted. No lower extremity edema B/L. Extremities are warm and well perfused.    Respiratory: Lungs CTAB. No wrr. No accessory muscle use.   Gastrointestinal:  Soft, nontender. Non-distended. Non-rigid.    Neurologic:  Alert and awake. Moving all extremities. Following commands.    Skin:  No gross rashes notes.      LABS:      CBC Full  -  ( 06 Apr 2022 11:47 )  WBC Count : 5.84 K/uL  RBC Count : 4.05 M/uL  Hemoglobin : 11.3 g/dL  Hematocrit : 35.2 %  Platelet Count - Automated : 336 K/uL  Mean Cell Volume : 86.9 fl  Mean Cell Hemoglobin : 27.9 pg  Mean Cell Hemoglobin Concentration : 32.1 gm/dL  Auto Neutrophil # : x  Auto Lymphocyte # : x  Auto Monocyte # : x  Auto Eosinophil # : x  Auto Basophil # : x  Auto Neutrophil % : x  Auto Lymphocyte % : x  Auto Monocyte % : x  Auto Eosinophil % : x  Auto Basophil % : x    04-05    136  |  103  |  21  ----------------------------<  107<H>  5.3   |  19<L>  |  1.24    Ca    9.3      05 Apr 2022 22:31    TPro  6.4  /  Alb  3.7  /  TBili  0.9  /  DBili  x   /  AST  41<H>  /  ALT  25  /  AlkPhos  124<H>  04-05    PT/INR - ( 06 Apr 2022 03:51 )   PT: 14.8 sec;   INR: 1.27 ratio         PTT - ( 06 Apr 2022 08:58 )  PTT:45.4 sec                  RADIOLOGY & ADDITIONAL STUDIES: PULMONARY SERVICE INITIAL CONSULT NOTE    HPI:  67M  Hx of CAD, CABG 2011, hx of cardiac stents 2020, prior smoker, and etoh use (w/ hx of w/d) transferred here from OSH for NSTEMI evaluation. Patient underwent a CTA Chest which showed emphysema, interlobular septal thickening, ground glass opacities, pleural effusions, and atelectasis. POCUS by ed suggests interstitial disease pattern with pleural effusions and 1x1.6 cm R paratracheal LN and 1.2x1.8 cm AP window LN. Formal TTE with moderate to severe MR, severely dilated LA, Globally LV systolic dysfx, flattening of IVS in systole/diastole, severe diastolic dysfx, RV enlargement with decreased RVSF, and estimate spap of 57. Dyspnea with exertion noted by patient but no other complaints. Pulmonary called for mediastinal LNs.    REVIEW OF SYSTEMS:  All additional ROS negative.    PAST MEDICAL & SURGICAL HISTORY:  Leg weakness      FAMILY HISTORY:  Noncontributory.     SOCIAL HISTORY:  Smoking Status: [ ] Current, [x ] Former, [ ] Never      MEDICATIONS:  Pulmonary:    Antimicrobials:    Anticoagulants:  aspirin enteric coated 81 milliGRAM(s) Oral daily  ticagrelor 90 milliGRAM(s) Oral two times a day    Onc:    GI/:  pantoprazole    Tablet 40 milliGRAM(s) Oral before breakfast    Endocrine:  atorvastatin 80 milliGRAM(s) Oral at bedtime    Cardiac:  furosemide   Injectable 40 milliGRAM(s) IV Push daily  losartan 25 milliGRAM(s) Oral daily  metoprolol tartrate 50 milliGRAM(s) Oral two times a day    Other Medications:  cyclobenzaprine 10 milliGRAM(s) Oral three times a day  DULoxetine 30 milliGRAM(s) Oral daily  gabapentin 100 milliGRAM(s) Oral three times a day      Allergies    No Known Allergies    Intolerances        PHYSICAL EXAM  Vital Signs Last 24 Hrs  T(C): 36.3 (07 Apr 2022 04:05), Max: 37.1 (06 Apr 2022 12:33)  T(F): 97.3 (07 Apr 2022 04:05), Max: 98.7 (06 Apr 2022 12:33)  HR: 74 (07 Apr 2022 04:05) (72 - 88)  BP: 124/74 (07 Apr 2022 04:05) (124/74 - 151/71)  BP(mean): 84 (06 Apr 2022 11:57) (84 - 84)  RR: 20 (07 Apr 2022 04:05) (20 - 20)  SpO2: 100% (06 Apr 2022 20:14) (89% - 100%)    04-06 @ 07:01  -  04-07 @ 07:00  --------------------------------------------------------  IN: 0 mL / OUT: 3150 mL / NET: -3150 mL          CONSTITUTIONAL: No acute distress.   HEENT:  Conjunctiva clear B/L.  Moist oral mucosa.   Cardiovascular: RRR with no murmurs. No JVD noted. No lower extremity edema B/L. Extremities are warm and well perfused.    Respiratory: Basilar rales. No accessory muscle use.   Gastrointestinal:  Soft, nontender. Non-distended. Non-rigid.    Neurologic:  Alert and awake. Moving all extremities. Following commands.    Skin:  No gross rashes notes.      LABS:      CBC Full  -  ( 06 Apr 2022 11:47 )  WBC Count : 5.84 K/uL  RBC Count : 4.05 M/uL  Hemoglobin : 11.3 g/dL  Hematocrit : 35.2 %  Platelet Count - Automated : 336 K/uL  Mean Cell Volume : 86.9 fl  Mean Cell Hemoglobin : 27.9 pg  Mean Cell Hemoglobin Concentration : 32.1 gm/dL  Auto Neutrophil # : x  Auto Lymphocyte # : x  Auto Monocyte # : x  Auto Eosinophil # : x  Auto Basophil # : x  Auto Neutrophil % : x  Auto Lymphocyte % : x  Auto Monocyte % : x  Auto Eosinophil % : x  Auto Basophil % : x    04-05    136  |  103  |  21  ----------------------------<  107<H>  5.3   |  19<L>  |  1.24    Ca    9.3      05 Apr 2022 22:31    TPro  6.4  /  Alb  3.7  /  TBili  0.9  /  DBili  x   /  AST  41<H>  /  ALT  25  /  AlkPhos  124<H>  04-05    PT/INR - ( 06 Apr 2022 03:51 )   PT: 14.8 sec;   INR: 1.27 ratio         PTT - ( 06 Apr 2022 08:58 )  PTT:45.4 sec                  RADIOLOGY & ADDITIONAL STUDIES:
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
## 2022-04-07 NOTE — PHYSICAL THERAPY INITIAL EVALUATION ADULT - PERTINENT HX OF CURRENT PROBLEM, REHAB EVAL
Pt is a 66 y/o male with PMH of HTN, CAD s/p CABG and stents, EtOH use disorder, current smoker, presented to OSH for nausea, vomiting, and shortness of breath, transferred to [facility] for NSTEMI evaluation. Patient underwent a CTA Chest which showed emphysema, interlobular septal thickening, ground glass opacities,
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
## 2022-04-07 NOTE — CONSULT NOTE ADULT - ASSESSMENT
Mr. Stock is a 67 year old man with history of HTN, CAD s/p CABG and stents, EtOH use disorder, current smoker, wheel chair dependent, presenting with neck pain, dizziness, and hemoptysis. Vascular Surgery consulted for incidental finding of occluded internal iliacs.      Recommendations  **** Mr. 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       Recommendations  **** Mr. 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       Recommendations  - no acute surgical intervention indicated  - recommend bilateral ABIs  - continue ASA, statin  - if patient were to require intervention, would need medicine and cardiac risk stratification    to be discussed with vascular fellow    Vascular Surgery  [id number] Mr. Stock is a 67 year old man with history of HTN, CAD s/p CABG and stents, EtOH use disorder, current smoker, presented to OSH for nausea, vomiting, and shortness of breath, transferred to [facility] for NSTEMI evaluation, found to have aortoiliac disease and symptoms of claudication.       Recommendations  - no acute surgical intervention indicated  - recommend bilateral ABIs  - continue ASA, statin  - if patient were to require intervention, would need medicine and cardiac risk stratification    to be discussed with vascular fellow    Vascular Surgery  [id number] Mr. Stock is a 67 year old man with history of HTN, CAD s/p CABG and stents, EtOH use disorder, current smoker, presented to OSH for nausea, vomiting, and shortness of breath, transferred to [facility] for NSTEMI evaluation, found to have aortoiliac disease and palpable distal pulses.      Recommendations  - no acute surgical intervention indicated  - continue ASA and statin  - recommend spine consult for radicular pain     to be discussed with vascular fellow    Vascular Surgery  [phone]

## 2022-04-08 LAB
ALBUMIN SERPL ELPH-MCNC: 4.2 G/DL — SIGNIFICANT CHANGE UP (ref 3.3–5)
ALP SERPL-CCNC: 146 U/L — HIGH (ref 40–120)
ALT FLD-CCNC: 36 U/L — SIGNIFICANT CHANGE UP (ref 10–45)
ANION GAP SERPL CALC-SCNC: 12 MMOL/L — SIGNIFICANT CHANGE UP (ref 5–17)
AST SERPL-CCNC: 35 U/L — SIGNIFICANT CHANGE UP (ref 10–40)
BILIRUB DIRECT SERPL-MCNC: 0.2 MG/DL — SIGNIFICANT CHANGE UP (ref 0–0.3)
BILIRUB INDIRECT FLD-MCNC: 0.7 MG/DL — SIGNIFICANT CHANGE UP (ref 0.2–1)
BILIRUB SERPL-MCNC: 0.9 MG/DL — SIGNIFICANT CHANGE UP (ref 0.2–1.2)
BUN SERPL-MCNC: 12 MG/DL — SIGNIFICANT CHANGE UP (ref 7–23)
CALCIUM SERPL-MCNC: 9.6 MG/DL — SIGNIFICANT CHANGE UP (ref 8.4–10.5)
CHLORIDE SERPL-SCNC: 98 MMOL/L — SIGNIFICANT CHANGE UP (ref 96–108)
CO2 SERPL-SCNC: 27 MMOL/L — SIGNIFICANT CHANGE UP (ref 22–31)
CREAT SERPL-MCNC: 1.28 MG/DL — SIGNIFICANT CHANGE UP (ref 0.5–1.3)
EGFR: 62 ML/MIN/1.73M2 — SIGNIFICANT CHANGE UP
GLUCOSE SERPL-MCNC: 120 MG/DL — HIGH (ref 70–99)
HCT VFR BLD CALC: 40 % — SIGNIFICANT CHANGE UP (ref 39–50)
HGB BLD-MCNC: 12.8 G/DL — LOW (ref 13–17)
MAGNESIUM SERPL-MCNC: 1.7 MG/DL — SIGNIFICANT CHANGE UP (ref 1.6–2.6)
MCHC RBC-ENTMCNC: 27.8 PG — SIGNIFICANT CHANGE UP (ref 27–34)
MCHC RBC-ENTMCNC: 32 GM/DL — SIGNIFICANT CHANGE UP (ref 32–36)
MCV RBC AUTO: 87 FL — SIGNIFICANT CHANGE UP (ref 80–100)
NRBC # BLD: 0 /100 WBCS — SIGNIFICANT CHANGE UP (ref 0–0)
PLATELET # BLD AUTO: 363 K/UL — SIGNIFICANT CHANGE UP (ref 150–400)
POTASSIUM SERPL-MCNC: 4.5 MMOL/L — SIGNIFICANT CHANGE UP (ref 3.5–5.3)
POTASSIUM SERPL-SCNC: 4.5 MMOL/L — SIGNIFICANT CHANGE UP (ref 3.5–5.3)
PROT SERPL-MCNC: 7.3 G/DL — SIGNIFICANT CHANGE UP (ref 6–8.3)
RBC # BLD: 4.6 M/UL — SIGNIFICANT CHANGE UP (ref 4.2–5.8)
RBC # FLD: 15 % — HIGH (ref 10.3–14.5)
SODIUM SERPL-SCNC: 137 MMOL/L — SIGNIFICANT CHANGE UP (ref 135–145)
WBC # BLD: 7.24 K/UL — SIGNIFICANT CHANGE UP (ref 3.8–10.5)
WBC # FLD AUTO: 7.24 K/UL — SIGNIFICANT CHANGE UP (ref 3.8–10.5)

## 2022-04-08 PROCEDURE — 99231 SBSQ HOSP IP/OBS SF/LOW 25: CPT

## 2022-04-08 RX ORDER — FUROSEMIDE 40 MG
20 TABLET ORAL DAILY
Refills: 0 | Status: DISCONTINUED | OUTPATIENT
Start: 2022-04-08 | End: 2022-04-12

## 2022-04-08 RX ORDER — ATORVASTATIN CALCIUM 80 MG/1
1 TABLET, FILM COATED ORAL
Qty: 0 | Refills: 0 | DISCHARGE

## 2022-04-08 RX ORDER — ASPIRIN/CALCIUM CARB/MAGNESIUM 324 MG
1 TABLET ORAL
Qty: 0 | Refills: 0 | DISCHARGE

## 2022-04-08 RX ORDER — SACUBITRIL AND VALSARTAN 24; 26 MG/1; MG/1
1 TABLET, FILM COATED ORAL
Refills: 0 | Status: DISCONTINUED | OUTPATIENT
Start: 2022-04-08 | End: 2022-04-13

## 2022-04-08 RX ORDER — POLYETHYLENE GLYCOL 3350 17 G/17G
17 POWDER, FOR SOLUTION ORAL DAILY
Refills: 0 | Status: DISCONTINUED | OUTPATIENT
Start: 2022-04-08 | End: 2022-04-12

## 2022-04-08 RX ORDER — METOPROLOL TARTRATE 50 MG
50 TABLET ORAL
Refills: 0 | Status: DISCONTINUED | OUTPATIENT
Start: 2022-04-08 | End: 2022-04-13

## 2022-04-08 RX ORDER — GABAPENTIN 400 MG/1
1 CAPSULE ORAL
Qty: 0 | Refills: 0 | DISCHARGE

## 2022-04-08 RX ADMIN — CYCLOBENZAPRINE HYDROCHLORIDE 10 MILLIGRAM(S): 10 TABLET, FILM COATED ORAL at 05:24

## 2022-04-08 RX ADMIN — ATORVASTATIN CALCIUM 80 MILLIGRAM(S): 80 TABLET, FILM COATED ORAL at 22:20

## 2022-04-08 RX ADMIN — GABAPENTIN 100 MILLIGRAM(S): 400 CAPSULE ORAL at 05:24

## 2022-04-08 RX ADMIN — Medication 20 MILLIGRAM(S): at 11:16

## 2022-04-08 RX ADMIN — SACUBITRIL AND VALSARTAN 1 TABLET(S): 24; 26 TABLET, FILM COATED ORAL at 17:00

## 2022-04-08 RX ADMIN — Medication 40 MILLIGRAM(S): at 05:24

## 2022-04-08 RX ADMIN — GABAPENTIN 100 MILLIGRAM(S): 400 CAPSULE ORAL at 22:19

## 2022-04-08 RX ADMIN — DULOXETINE HYDROCHLORIDE 30 MILLIGRAM(S): 30 CAPSULE, DELAYED RELEASE ORAL at 11:16

## 2022-04-08 RX ADMIN — PANTOPRAZOLE SODIUM 40 MILLIGRAM(S): 20 TABLET, DELAYED RELEASE ORAL at 05:24

## 2022-04-08 RX ADMIN — CYCLOBENZAPRINE HYDROCHLORIDE 10 MILLIGRAM(S): 10 TABLET, FILM COATED ORAL at 22:19

## 2022-04-08 RX ADMIN — TICAGRELOR 90 MILLIGRAM(S): 90 TABLET ORAL at 17:01

## 2022-04-08 RX ADMIN — LOSARTAN POTASSIUM 25 MILLIGRAM(S): 100 TABLET, FILM COATED ORAL at 05:24

## 2022-04-08 RX ADMIN — Medication 50 MILLIGRAM(S): at 05:24

## 2022-04-08 RX ADMIN — GABAPENTIN 100 MILLIGRAM(S): 400 CAPSULE ORAL at 13:10

## 2022-04-08 RX ADMIN — Medication 81 MILLIGRAM(S): at 11:16

## 2022-04-08 RX ADMIN — TICAGRELOR 90 MILLIGRAM(S): 90 TABLET ORAL at 05:24

## 2022-04-08 RX ADMIN — POLYETHYLENE GLYCOL 3350 17 GRAM(S): 17 POWDER, FOR SOLUTION ORAL at 11:16

## 2022-04-08 RX ADMIN — Medication 50 MILLIGRAM(S): at 17:04

## 2022-04-08 RX ADMIN — CYCLOBENZAPRINE HYDROCHLORIDE 10 MILLIGRAM(S): 10 TABLET, FILM COATED ORAL at 13:10
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
## 2022-04-09 LAB
ALBUMIN SERPL ELPH-MCNC: 3.8 G/DL — SIGNIFICANT CHANGE UP (ref 3.3–5)
ALP SERPL-CCNC: 131 U/L — HIGH (ref 40–120)
ALT FLD-CCNC: 29 U/L — SIGNIFICANT CHANGE UP (ref 10–45)
ANION GAP SERPL CALC-SCNC: 12 MMOL/L — SIGNIFICANT CHANGE UP (ref 5–17)
AST SERPL-CCNC: 28 U/L — SIGNIFICANT CHANGE UP (ref 10–40)
BILIRUB DIRECT SERPL-MCNC: 0.2 MG/DL — SIGNIFICANT CHANGE UP (ref 0–0.3)
BILIRUB INDIRECT FLD-MCNC: 0.6 MG/DL — SIGNIFICANT CHANGE UP (ref 0.2–1)
BILIRUB SERPL-MCNC: 0.8 MG/DL — SIGNIFICANT CHANGE UP (ref 0.2–1.2)
BUN SERPL-MCNC: 15 MG/DL — SIGNIFICANT CHANGE UP (ref 7–23)
CALCIUM SERPL-MCNC: 9.6 MG/DL — SIGNIFICANT CHANGE UP (ref 8.4–10.5)
CHLORIDE SERPL-SCNC: 97 MMOL/L — SIGNIFICANT CHANGE UP (ref 96–108)
CO2 SERPL-SCNC: 27 MMOL/L — SIGNIFICANT CHANGE UP (ref 22–31)
CREAT SERPL-MCNC: 0.99 MG/DL — SIGNIFICANT CHANGE UP (ref 0.5–1.3)
EGFR: 84 ML/MIN/1.73M2 — SIGNIFICANT CHANGE UP
GLUCOSE SERPL-MCNC: 142 MG/DL — HIGH (ref 70–99)
POTASSIUM SERPL-MCNC: 4.3 MMOL/L — SIGNIFICANT CHANGE UP (ref 3.5–5.3)
POTASSIUM SERPL-SCNC: 4.3 MMOL/L — SIGNIFICANT CHANGE UP (ref 3.5–5.3)
PROT SERPL-MCNC: 7.1 G/DL — SIGNIFICANT CHANGE UP (ref 6–8.3)
SODIUM SERPL-SCNC: 136 MMOL/L — SIGNIFICANT CHANGE UP (ref 135–145)

## 2022-04-09 RX ORDER — HEPARIN SODIUM 5000 [USP'U]/ML
5000 INJECTION INTRAVENOUS; SUBCUTANEOUS EVERY 12 HOURS
Refills: 0 | Status: DISCONTINUED | OUTPATIENT
Start: 2022-04-09 | End: 2022-04-13

## 2022-04-09 RX ADMIN — GABAPENTIN 100 MILLIGRAM(S): 400 CAPSULE ORAL at 13:19

## 2022-04-09 RX ADMIN — CYCLOBENZAPRINE HYDROCHLORIDE 10 MILLIGRAM(S): 10 TABLET, FILM COATED ORAL at 05:29

## 2022-04-09 RX ADMIN — GABAPENTIN 100 MILLIGRAM(S): 400 CAPSULE ORAL at 23:07

## 2022-04-09 RX ADMIN — Medication 20 MILLIGRAM(S): at 05:30

## 2022-04-09 RX ADMIN — GABAPENTIN 100 MILLIGRAM(S): 400 CAPSULE ORAL at 05:30

## 2022-04-09 RX ADMIN — DULOXETINE HYDROCHLORIDE 30 MILLIGRAM(S): 30 CAPSULE, DELAYED RELEASE ORAL at 11:30

## 2022-04-09 RX ADMIN — Medication 50 MILLIGRAM(S): at 05:33

## 2022-04-09 RX ADMIN — SACUBITRIL AND VALSARTAN 1 TABLET(S): 24; 26 TABLET, FILM COATED ORAL at 17:34

## 2022-04-09 RX ADMIN — Medication 81 MILLIGRAM(S): at 11:30

## 2022-04-09 RX ADMIN — HEPARIN SODIUM 5000 UNIT(S): 5000 INJECTION INTRAVENOUS; SUBCUTANEOUS at 23:07

## 2022-04-09 RX ADMIN — CYCLOBENZAPRINE HYDROCHLORIDE 10 MILLIGRAM(S): 10 TABLET, FILM COATED ORAL at 23:07

## 2022-04-09 RX ADMIN — TICAGRELOR 90 MILLIGRAM(S): 90 TABLET ORAL at 17:34

## 2022-04-09 RX ADMIN — CYCLOBENZAPRINE HYDROCHLORIDE 10 MILLIGRAM(S): 10 TABLET, FILM COATED ORAL at 13:19

## 2022-04-09 RX ADMIN — SACUBITRIL AND VALSARTAN 1 TABLET(S): 24; 26 TABLET, FILM COATED ORAL at 05:46

## 2022-04-09 RX ADMIN — Medication 50 MILLIGRAM(S): at 17:35

## 2022-04-09 RX ADMIN — TICAGRELOR 90 MILLIGRAM(S): 90 TABLET ORAL at 05:29

## 2022-04-09 RX ADMIN — PANTOPRAZOLE SODIUM 40 MILLIGRAM(S): 20 TABLET, DELAYED RELEASE ORAL at 05:29

## 2022-04-09 RX ADMIN — ATORVASTATIN CALCIUM 80 MILLIGRAM(S): 80 TABLET, FILM COATED ORAL at 23:07
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
## 2022-04-10 PROCEDURE — 99223 1ST HOSP IP/OBS HIGH 75: CPT

## 2022-04-10 RX ORDER — CEFTRIAXONE 500 MG/1
1000 INJECTION, POWDER, FOR SOLUTION INTRAMUSCULAR; INTRAVENOUS EVERY 24 HOURS
Refills: 0 | Status: DISCONTINUED | OUTPATIENT
Start: 2022-04-10 | End: 2022-04-12

## 2022-04-10 RX ORDER — FOLIC ACID 0.8 MG
1 TABLET ORAL DAILY
Refills: 0 | Status: DISCONTINUED | OUTPATIENT
Start: 2022-04-10 | End: 2022-04-12

## 2022-04-10 RX ORDER — THIAMINE MONONITRATE (VIT B1) 100 MG
100 TABLET ORAL DAILY
Refills: 0 | Status: DISCONTINUED | OUTPATIENT
Start: 2022-04-10 | End: 2022-04-12

## 2022-04-10 RX ADMIN — POLYETHYLENE GLYCOL 3350 17 GRAM(S): 17 POWDER, FOR SOLUTION ORAL at 11:01

## 2022-04-10 RX ADMIN — TICAGRELOR 90 MILLIGRAM(S): 90 TABLET ORAL at 05:33

## 2022-04-10 RX ADMIN — GABAPENTIN 100 MILLIGRAM(S): 400 CAPSULE ORAL at 13:29

## 2022-04-10 RX ADMIN — Medication 81 MILLIGRAM(S): at 11:01

## 2022-04-10 RX ADMIN — ATORVASTATIN CALCIUM 80 MILLIGRAM(S): 80 TABLET, FILM COATED ORAL at 21:19

## 2022-04-10 RX ADMIN — Medication 100 MILLIGRAM(S): at 13:29

## 2022-04-10 RX ADMIN — Medication 20 MILLIGRAM(S): at 05:33

## 2022-04-10 RX ADMIN — SACUBITRIL AND VALSARTAN 1 TABLET(S): 24; 26 TABLET, FILM COATED ORAL at 05:33

## 2022-04-10 RX ADMIN — HEPARIN SODIUM 5000 UNIT(S): 5000 INJECTION INTRAVENOUS; SUBCUTANEOUS at 17:24

## 2022-04-10 RX ADMIN — GABAPENTIN 100 MILLIGRAM(S): 400 CAPSULE ORAL at 21:19

## 2022-04-10 RX ADMIN — DULOXETINE HYDROCHLORIDE 30 MILLIGRAM(S): 30 CAPSULE, DELAYED RELEASE ORAL at 11:01

## 2022-04-10 RX ADMIN — Medication 50 MILLIGRAM(S): at 17:24

## 2022-04-10 RX ADMIN — SACUBITRIL AND VALSARTAN 1 TABLET(S): 24; 26 TABLET, FILM COATED ORAL at 17:24

## 2022-04-10 RX ADMIN — TICAGRELOR 90 MILLIGRAM(S): 90 TABLET ORAL at 17:24

## 2022-04-10 RX ADMIN — Medication 1 MILLIGRAM(S): at 13:29

## 2022-04-10 RX ADMIN — CEFTRIAXONE 100 MILLIGRAM(S): 500 INJECTION, POWDER, FOR SOLUTION INTRAMUSCULAR; INTRAVENOUS at 09:48

## 2022-04-10 RX ADMIN — GABAPENTIN 100 MILLIGRAM(S): 400 CAPSULE ORAL at 05:33

## 2022-04-10 RX ADMIN — CYCLOBENZAPRINE HYDROCHLORIDE 10 MILLIGRAM(S): 10 TABLET, FILM COATED ORAL at 05:33

## 2022-04-10 RX ADMIN — PANTOPRAZOLE SODIUM 40 MILLIGRAM(S): 20 TABLET, DELAYED RELEASE ORAL at 05:34

## 2022-04-10 RX ADMIN — HEPARIN SODIUM 5000 UNIT(S): 5000 INJECTION INTRAVENOUS; SUBCUTANEOUS at 06:44

## 2022-04-10 RX ADMIN — CYCLOBENZAPRINE HYDROCHLORIDE 10 MILLIGRAM(S): 10 TABLET, FILM COATED ORAL at 21:19

## 2022-04-10 RX ADMIN — Medication 50 MILLIGRAM(S): at 05:33

## 2022-04-10 RX ADMIN — CYCLOBENZAPRINE HYDROCHLORIDE 10 MILLIGRAM(S): 10 TABLET, FILM COATED ORAL at 13:29

## 2022-04-10 RX ADMIN — Medication 1 TABLET(S): at 13:29
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
## 2022-04-10 NOTE — DIETITIAN INITIAL EVALUATION ADULT - PERTINENT LABORATORY DATA
04-09    136  |  97  |  15  ----------------------------<  142<H>  4.3   |  27  |  0.99    Ca    9.6      09 Apr 2022 10:21    TPro  7.1  /  Alb  3.8  /  TBili  0.8  /  DBili  0.2  /  AST  28  /  ALT  29  /  AlkPhos  131<H>  04-09

## 2022-04-10 NOTE — DIETITIAN INITIAL EVALUATION ADULT - PERTINENT MEDS FT
MEDICATIONS  (STANDING):  aspirin enteric coated 81 milliGRAM(s) Oral daily  atorvastatin 80 milliGRAM(s) Oral at bedtime  cefTRIAXone   IVPB 1000 milliGRAM(s) IV Intermittent every 24 hours  cyclobenzaprine 10 milliGRAM(s) Oral three times a day  DULoxetine 30 milliGRAM(s) Oral daily  furosemide   Injectable 20 milliGRAM(s) IV Push daily  gabapentin 100 milliGRAM(s) Oral three times a day  heparin   Injectable 5000 Unit(s) SubCutaneous every 12 hours  metoprolol succinate ER 50 milliGRAM(s) Oral two times a day  pantoprazole    Tablet 40 milliGRAM(s) Oral before breakfast  polyethylene glycol 3350 17 Gram(s) Oral daily  sacubitril 24 mG/valsartan 26 mG 1 Tablet(s) Oral two times a day  ticagrelor 90 milliGRAM(s) Oral two times a day    MEDICATIONS  (PRN):  acetaminophen     Tablet .. 650 milliGRAM(s) Oral every 6 hours PRN Temp greater or equal to 38C (100.4F), Mild Pain (1 - 3)
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
## 2022-04-10 NOTE — CONSULT NOTE ADULT - CONSULT REQUESTED DATE/TIME
06-Apr-2022 14:08
07-Apr-2022 13:43
05-Apr-2022 22:59
07-Apr-2022 10:11
10-Apr-2022 09:47
07-Apr-2022
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
## 2022-04-11 LAB
LEGIONELLA AG UR QL: NEGATIVE — SIGNIFICANT CHANGE UP
SARS-COV-2 RNA SPEC QL NAA+PROBE: SIGNIFICANT CHANGE UP

## 2022-04-11 PROCEDURE — 99233 SBSQ HOSP IP/OBS HIGH 50: CPT

## 2022-04-11 PROCEDURE — 93458 L HRT ARTERY/VENTRICLE ANGIO: CPT | Mod: 26

## 2022-04-11 PROCEDURE — 93567 NJX CAR CTH SPRVLV AORTGRPHY: CPT

## 2022-04-11 PROCEDURE — 99152 MOD SED SAME PHYS/QHP 5/>YRS: CPT

## 2022-04-11 PROCEDURE — 99232 SBSQ HOSP IP/OBS MODERATE 35: CPT

## 2022-04-11 RX ADMIN — ATORVASTATIN CALCIUM 80 MILLIGRAM(S): 80 TABLET, FILM COATED ORAL at 21:45

## 2022-04-11 RX ADMIN — DULOXETINE HYDROCHLORIDE 30 MILLIGRAM(S): 30 CAPSULE, DELAYED RELEASE ORAL at 11:31

## 2022-04-11 RX ADMIN — Medication 20 MILLIGRAM(S): at 06:07

## 2022-04-11 RX ADMIN — TICAGRELOR 90 MILLIGRAM(S): 90 TABLET ORAL at 06:08

## 2022-04-11 RX ADMIN — SACUBITRIL AND VALSARTAN 1 TABLET(S): 24; 26 TABLET, FILM COATED ORAL at 21:45

## 2022-04-11 RX ADMIN — Medication 1 MILLIGRAM(S): at 11:31

## 2022-04-11 RX ADMIN — Medication 1 TABLET(S): at 11:31

## 2022-04-11 RX ADMIN — Medication 100 MILLIGRAM(S): at 11:31

## 2022-04-11 RX ADMIN — Medication 81 MILLIGRAM(S): at 11:31

## 2022-04-11 RX ADMIN — Medication 50 MILLIGRAM(S): at 21:45

## 2022-04-11 RX ADMIN — TICAGRELOR 90 MILLIGRAM(S): 90 TABLET ORAL at 21:45

## 2022-04-11 RX ADMIN — CYCLOBENZAPRINE HYDROCHLORIDE 10 MILLIGRAM(S): 10 TABLET, FILM COATED ORAL at 06:07

## 2022-04-11 RX ADMIN — CYCLOBENZAPRINE HYDROCHLORIDE 10 MILLIGRAM(S): 10 TABLET, FILM COATED ORAL at 22:34

## 2022-04-11 RX ADMIN — Medication 650 MILLIGRAM(S): at 07:51

## 2022-04-11 RX ADMIN — GABAPENTIN 100 MILLIGRAM(S): 400 CAPSULE ORAL at 21:45

## 2022-04-11 RX ADMIN — PANTOPRAZOLE SODIUM 40 MILLIGRAM(S): 20 TABLET, DELAYED RELEASE ORAL at 06:08

## 2022-04-11 RX ADMIN — CYCLOBENZAPRINE HYDROCHLORIDE 10 MILLIGRAM(S): 10 TABLET, FILM COATED ORAL at 11:31

## 2022-04-11 RX ADMIN — Medication 650 MILLIGRAM(S): at 06:09

## 2022-04-11 RX ADMIN — Medication 50 MILLIGRAM(S): at 06:08

## 2022-04-11 RX ADMIN — GABAPENTIN 100 MILLIGRAM(S): 400 CAPSULE ORAL at 06:08

## 2022-04-11 RX ADMIN — SACUBITRIL AND VALSARTAN 1 TABLET(S): 24; 26 TABLET, FILM COATED ORAL at 06:07

## 2022-04-11 RX ADMIN — CEFTRIAXONE 100 MILLIGRAM(S): 500 INJECTION, POWDER, FOR SOLUTION INTRAMUSCULAR; INTRAVENOUS at 11:30

## 2022-04-11 RX ADMIN — HEPARIN SODIUM 5000 UNIT(S): 5000 INJECTION INTRAVENOUS; SUBCUTANEOUS at 06:07

## 2022-04-11 RX ADMIN — HEPARIN SODIUM 5000 UNIT(S): 5000 INJECTION INTRAVENOUS; SUBCUTANEOUS at 21:45
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
## 2022-04-11 NOTE — PROGRESS NOTE ADULT - NS PANP COMMENT GEN_ALL_CORE FT
ChF with abdominal pain, now resolved with diuresis, no biliary dilation    plan ppi daily          management as above.
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
## 2022-04-12 LAB
ALBUMIN SERPL ELPH-MCNC: 3.6 G/DL — SIGNIFICANT CHANGE UP (ref 3.3–5)
ALP SERPL-CCNC: 112 U/L — SIGNIFICANT CHANGE UP (ref 40–120)
ALT FLD-CCNC: 18 U/L — SIGNIFICANT CHANGE UP (ref 10–45)
ANION GAP SERPL CALC-SCNC: 12 MMOL/L — SIGNIFICANT CHANGE UP (ref 5–17)
AST SERPL-CCNC: 20 U/L — SIGNIFICANT CHANGE UP (ref 10–40)
BILIRUB DIRECT SERPL-MCNC: <0.1 MG/DL — SIGNIFICANT CHANGE UP (ref 0–0.3)
BILIRUB INDIRECT FLD-MCNC: >0.2 MG/DL — SIGNIFICANT CHANGE UP (ref 0.2–1)
BILIRUB SERPL-MCNC: 0.3 MG/DL — SIGNIFICANT CHANGE UP (ref 0.2–1.2)
BUN SERPL-MCNC: 19 MG/DL — SIGNIFICANT CHANGE UP (ref 7–23)
CALCIUM SERPL-MCNC: 10 MG/DL — SIGNIFICANT CHANGE UP (ref 8.4–10.5)
CHLORIDE SERPL-SCNC: 99 MMOL/L — SIGNIFICANT CHANGE UP (ref 96–108)
CO2 SERPL-SCNC: 25 MMOL/L — SIGNIFICANT CHANGE UP (ref 22–31)
CREAT SERPL-MCNC: 0.95 MG/DL — SIGNIFICANT CHANGE UP (ref 0.5–1.3)
EGFR: 88 ML/MIN/1.73M2 — SIGNIFICANT CHANGE UP
GLUCOSE SERPL-MCNC: 89 MG/DL — SIGNIFICANT CHANGE UP (ref 70–99)
HCT VFR BLD CALC: 42.8 % — SIGNIFICANT CHANGE UP (ref 39–50)
HGB BLD-MCNC: 13.5 G/DL — SIGNIFICANT CHANGE UP (ref 13–17)
MCHC RBC-ENTMCNC: 27.6 PG — SIGNIFICANT CHANGE UP (ref 27–34)
MCHC RBC-ENTMCNC: 31.5 GM/DL — LOW (ref 32–36)
MCV RBC AUTO: 87.3 FL — SIGNIFICANT CHANGE UP (ref 80–100)
NRBC # BLD: 0 /100 WBCS — SIGNIFICANT CHANGE UP (ref 0–0)
PLATELET # BLD AUTO: 356 K/UL — SIGNIFICANT CHANGE UP (ref 150–400)
POTASSIUM SERPL-MCNC: 4.6 MMOL/L — SIGNIFICANT CHANGE UP (ref 3.5–5.3)
POTASSIUM SERPL-SCNC: 4.6 MMOL/L — SIGNIFICANT CHANGE UP (ref 3.5–5.3)
PROT SERPL-MCNC: 6.8 G/DL — SIGNIFICANT CHANGE UP (ref 6–8.3)
RBC # BLD: 4.9 M/UL — SIGNIFICANT CHANGE UP (ref 4.2–5.8)
RBC # FLD: 15.2 % — HIGH (ref 10.3–14.5)
SODIUM SERPL-SCNC: 136 MMOL/L — SIGNIFICANT CHANGE UP (ref 135–145)
WBC # BLD: 5.9 K/UL — SIGNIFICANT CHANGE UP (ref 3.8–10.5)
WBC # FLD AUTO: 5.9 K/UL — SIGNIFICANT CHANGE UP (ref 3.8–10.5)

## 2022-04-12 PROCEDURE — 99232 SBSQ HOSP IP/OBS MODERATE 35: CPT

## 2022-04-12 RX ORDER — CLOPIDOGREL BISULFATE 75 MG/1
75 TABLET, FILM COATED ORAL DAILY
Refills: 0 | Status: DISCONTINUED | OUTPATIENT
Start: 2022-04-12 | End: 2022-04-13

## 2022-04-12 RX ORDER — DULOXETINE HYDROCHLORIDE 30 MG/1
20 CAPSULE, DELAYED RELEASE ORAL DAILY
Refills: 0 | Status: DISCONTINUED | OUTPATIENT
Start: 2022-04-12 | End: 2022-04-13

## 2022-04-12 RX ORDER — DULOXETINE HYDROCHLORIDE 30 MG/1
10 CAPSULE, DELAYED RELEASE ORAL DAILY
Refills: 0 | Status: DISCONTINUED | OUTPATIENT
Start: 2022-04-12 | End: 2022-04-12

## 2022-04-12 RX ORDER — CYCLOBENZAPRINE HYDROCHLORIDE 10 MG/1
10 TABLET, FILM COATED ORAL THREE TIMES A DAY
Refills: 0 | Status: DISCONTINUED | OUTPATIENT
Start: 2022-04-12 | End: 2022-04-13

## 2022-04-12 RX ORDER — CEFUROXIME AXETIL 250 MG
500 TABLET ORAL EVERY 12 HOURS
Refills: 0 | Status: DISCONTINUED | OUTPATIENT
Start: 2022-04-12 | End: 2022-04-13

## 2022-04-12 RX ORDER — FUROSEMIDE 40 MG
20 TABLET ORAL DAILY
Refills: 0 | Status: DISCONTINUED | OUTPATIENT
Start: 2022-04-12 | End: 2022-04-13

## 2022-04-12 RX ORDER — GABAPENTIN 400 MG/1
100 CAPSULE ORAL THREE TIMES A DAY
Refills: 0 | Status: DISCONTINUED | OUTPATIENT
Start: 2022-04-12 | End: 2022-04-13

## 2022-04-12 RX ADMIN — GABAPENTIN 100 MILLIGRAM(S): 400 CAPSULE ORAL at 13:34

## 2022-04-12 RX ADMIN — TICAGRELOR 90 MILLIGRAM(S): 90 TABLET ORAL at 05:21

## 2022-04-12 RX ADMIN — Medication 20 MILLIGRAM(S): at 05:21

## 2022-04-12 RX ADMIN — HEPARIN SODIUM 5000 UNIT(S): 5000 INJECTION INTRAVENOUS; SUBCUTANEOUS at 17:46

## 2022-04-12 RX ADMIN — Medication 50 MILLIGRAM(S): at 08:18

## 2022-04-12 RX ADMIN — HEPARIN SODIUM 5000 UNIT(S): 5000 INJECTION INTRAVENOUS; SUBCUTANEOUS at 05:23

## 2022-04-12 RX ADMIN — Medication 81 MILLIGRAM(S): at 11:08

## 2022-04-12 RX ADMIN — Medication 50 MILLIGRAM(S): at 17:47

## 2022-04-12 RX ADMIN — Medication 500 MILLIGRAM(S): at 18:47

## 2022-04-12 RX ADMIN — SACUBITRIL AND VALSARTAN 1 TABLET(S): 24; 26 TABLET, FILM COATED ORAL at 08:19

## 2022-04-12 RX ADMIN — ATORVASTATIN CALCIUM 80 MILLIGRAM(S): 80 TABLET, FILM COATED ORAL at 21:51

## 2022-04-12 RX ADMIN — GABAPENTIN 100 MILLIGRAM(S): 400 CAPSULE ORAL at 05:22

## 2022-04-12 RX ADMIN — SACUBITRIL AND VALSARTAN 1 TABLET(S): 24; 26 TABLET, FILM COATED ORAL at 17:48

## 2022-04-12 RX ADMIN — CEFTRIAXONE 100 MILLIGRAM(S): 500 INJECTION, POWDER, FOR SOLUTION INTRAMUSCULAR; INTRAVENOUS at 09:13

## 2022-04-12 RX ADMIN — PANTOPRAZOLE SODIUM 40 MILLIGRAM(S): 20 TABLET, DELAYED RELEASE ORAL at 05:21

## 2022-04-12 RX ADMIN — GABAPENTIN 100 MILLIGRAM(S): 400 CAPSULE ORAL at 21:50

## 2022-04-12 RX ADMIN — CYCLOBENZAPRINE HYDROCHLORIDE 10 MILLIGRAM(S): 10 TABLET, FILM COATED ORAL at 06:46

## 2022-04-12 RX ADMIN — CLOPIDOGREL BISULFATE 75 MILLIGRAM(S): 75 TABLET, FILM COATED ORAL at 12:57
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
## 2022-04-12 NOTE — DISCHARGE NOTE PROVIDER - NSDCFUADDINST_GEN_ALL_CORE_FT
No heavy lifting, strenuous activity, bending, straining or unnecessary stair climbing for 2 weeks. No sex for 1 week.  No driving for 2 days. You may shower 24 hours following procedure but avoid baths and swimming for 1 week. Check groin site for bleeding and/or swelling daily following procedure. Call your doctor/cardiologist immediately for bleeding or swelling or if you have increased/persistent pain, fever/chills, or drainage at the site. Follow up with your cardiologist in 1- 2 weeks. You may call [facility] at [phone] or [phone] after office hours and weekends with any questions or concerns following your procedure.

## 2022-04-12 NOTE — DISCHARGE NOTE PROVIDER - PROVIDER TOKENS
PROVIDER:[TOKEN:[[id number]]] PROVIDER:[TOKEN:[[id number]]],PROVIDER:[TOKEN:[[id number]],FOLLOWUP:[2 weeks]],PROVIDER:[TOKEN:[[id number]],FOLLOWUP:[2 weeks]]

## 2022-04-12 NOTE — DISCHARGE NOTE PROVIDER - NSDCCPTREATMENT_GEN_ALL_CORE_FT
PRINCIPAL PROCEDURE  Procedure: Left heart cardiac cath  Findings and Treatment: Prior stents are patent.  Continue your medications. Do not stop Aspirin or Plavix unless instructed by your cardiologist.  No heavy lifting, strenuous activity, bending, straining or unnecessary stair climbing for 2 weeks. No sex for 1 week.  No driving for 2 days. You may shower 24 hours following procedure but avoid baths and swimming for 1 week. Check groin site for bleeding and/or swelling daily following procedure. Call your doctor/cardiologist immediately for bleeding or swelling or if you have increased/persistent pain, fever/chills, or drainage at the site. Follow up with your cardiologist in 1- 2 weeks. You may call [facility] at [phone] or [phone] after office hours and weekends with any questions or concerns following your procedure.

## 2022-04-12 NOTE — DISCHARGE NOTE PROVIDER - NSDCMRMEDTOKEN_GEN_ALL_CORE_FT
amLODIPine 10 mg oral tablet: 1 tab(s) orally once a day  Aspirin Enteric Coated 81 mg oral delayed release tablet: 1 tab(s) orally once a day  Brilinta (ticagrelor) 90 mg oral tablet: 1 tab(s) orally 2 times a day  DULoxetine 30 mg oral delayed release capsule: 1 cap(s) orally once a day  Flexeril 10 mg oral tablet: 1 tab(s) orally once a day (at bedtime)  gabapentin 100 mg oral capsule: 1 cap(s) orally 3 times a day  Lipitor 80 mg oral tablet: 1 tab(s) orally once a day  Metoprolol Tartrate 50 mg oral tablet: 1 tab(s) orally 2 times a day  Zanaflex 4 mg oral tablet: 1 tab(s) orally 3 times a day   Aspirin Enteric Coated 81 mg oral delayed release tablet: 1 tab(s) orally once a day  DULoxetine 30 mg oral delayed release capsule: 1 cap(s) orally once a day  Flexeril 10 mg oral tablet: 1 tab(s) orally once a day (at bedtime) PRN muscle spans  gabapentin 100 mg oral capsule: 1 cap(s) orally 3 times a day  Lipitor 80 mg oral tablet: 1 tab(s) orally once a day  Metoprolol Tartrate 50 mg oral tablet: 1 tab(s) orally 2 times a day  sacubitril-valsartan 24 mg-26 mg oral tablet: 1 tab(s) orally 2 times a day   Aspirin Enteric Coated 81 mg oral delayed release tablet: 1 tab(s) orally once a day  cefuroxime 500 mg oral tablet: 1 tab(s) orally every 12 hours  clopidogrel 75 mg oral tablet: 1 tab(s) orally once a day  Cymbalta 20 mg oral delayed release capsule: 1 cap(s) orally once a day  Flexeril 10 mg oral tablet: 1 tab(s) orally once a day (at bedtime) PRN muscle spans  furosemide 20 mg oral tablet: 1 tab(s) orally once a day  gabapentin 100 mg oral capsule: 1 cap(s) orally 3 times a day  Lipitor 80 mg oral tablet: 1 tab(s) orally once a day  Metoprolol Succinate ER 50 mg oral tablet, extended release: 1 tab(s) orally 2 times a day  nitroglycerin 0.3 mg sublingual tablet: 1 tab(s) sublingually every 5 minutes x3 doses. If pain not relieved call MD  sacubitril-valsartan 24 mg-26 mg oral tablet: 1 tab(s) orally 2 times a day

## 2022-04-12 NOTE — DISCHARGE NOTE PROVIDER - CARE PROVIDER_API CALL
Vivek Proctor  CARDIOVASCULAR DISEASE  [address], [address]  [place] [address]  Phone: [phone]  Fax: [phone]  Follow Up Time:    Vivek Proctor  CARDIOVASCULAR DISEASE  [address], [address]  [place] [address]  Phone: [phone]  Fax: [phone]  Follow Up Time:     Whit Michaels  GASTROENTEROLOGY  [address], [address]  [place], [place] [id number]  Phone: [phone]  Fax: [phone]  Follow Up Time: 2 weeks    TASHIA KAUFMAN  Internal Medicine  N  SHAE LAO, Phys,    Phone: ()-  Fax: ()-  Follow Up Time: 2 weeks
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
## 2022-04-12 NOTE — DISCHARGE NOTE PROVIDER - NSDCCPCAREPLAN_GEN_ALL_CORE_FT
PRINCIPAL DISCHARGE DIAGNOSIS  Diagnosis: Chest pain  Assessment and Plan of Treatment: Resolved  HOME CARE INSTRUCTIONS  For the next few days, avoid physical activities that bring on chest pain. Continue physical activities as directed.  Do not smoke.  Avoid drinking alcohol.   Only take over-the-counter or prescription medicine for pain, discomfort, or fever as directed by your caregiver.  Follow your caregiver's suggestions for further testing if your chest pain does not go away.  Keep any follow-up appointments you made. If you do not go to an appointment, you could develop lasting (chronic) problems with pain. If there is any problem keeping an appointment, you must call to reschedule.   SEEK MEDICAL CARE IF:  You think you are having problems from the medicine you are taking. Read your medicine instructions carefully.  Your chest pain does not go away, even after treatment.  You develop a rash with blisters on your chest.  SEEK IMMEDIATE MEDICAL CARE IF:  You have increased chest pain or pain that spreads to your arm, neck, jaw, back, or abdomen.   You develop shortness of breath, an increasing cough, or you are coughing up blood.  You have severe back or abdominal pain, feel nauseous, or vomit.  You develop severe weakness, fainting, or chills.  You have a fever.  THIS IS AN EMERGENCY. Do not wait to see if the pain will go away. Get medical help at once. Call your local emergency services ([phone]). Do not drive yourself to the hospital.        SECONDARY DISCHARGE DIAGNOSES  Diagnosis: Shortness of breath  Assessment and Plan of Treatment:      PRINCIPAL DISCHARGE DIAGNOSIS  Diagnosis: Chest pain  Assessment and Plan of Treatment: Resolved  HOME CARE INSTRUCTIONS  For the next few days, avoid physical activities that bring on chest pain. Continue physical activities as directed.  Do not smoke.  Avoid drinking alcohol.   Only take over-the-counter or prescription medicine for pain, discomfort, or fever as directed by your caregiver.  Follow your caregiver's suggestions for further testing if your chest pain does not go away.  Keep any follow-up appointments you made. If you do not go to an appointment, you could develop lasting (chronic) problems with pain. If there is any problem keeping an appointment, you must call to reschedule.   SEEK MEDICAL CARE IF:  You think you are having problems from the medicine you are taking. Read your medicine instructions carefully.  Your chest pain does not go away, even after treatment.  You develop a rash with blisters on your chest.  SEEK IMMEDIATE MEDICAL CARE IF:  You have increased chest pain or pain that spreads to your arm, neck, jaw, back, or abdomen.   You develop shortness of breath, an increasing cough, or you are coughing up blood.  You have severe back or abdominal pain, feel nauseous, or vomit.  You develop severe weakness, fainting, or chills.  You have a fever.  THIS IS AN EMERGENCY. Do not wait to see if the pain will go away. Get medical help at once. Call your local emergency services ([phone]). Do not drive yourself to the hospital.        SECONDARY DISCHARGE DIAGNOSES  Diagnosis: Acute CHF  Assessment and Plan of Treatment: Weigh yourself daily.  If you gain 3lbs in 3 days, or 5lbs in a week call your Health Care Provider.  Do not eat or drink foods containing more than 2000mg of salt (sodium) in your diet every day.  Call your Health Care Provider if you have any swelling or increased swelling in your feet, ankles, and/or stomach.  Take all of your medication as directed.  If you become dizzy call your Health Care Provider.      Diagnosis: Pneumonia  Assessment and Plan of Treatment: Pneumonia is a lung infection that can cause a fever, cough, and trouble breathing.  Continue all antibiotics as ordered until complete.  Nutrition is important, eat small frequent meals.  Get lots of rest and drink fluids.  Call your health care provider upon arrival home from hospital and make a follow up appointment for one week.  If your cough worsens, you develop fever greater than 101', you have shaking chills, a fast heartbeat, trouble breathing and/or feel your are breathing much faster than usual, call your healthcare provider.  Make sure you wash your hands frequently.

## 2022-04-12 NOTE — DISCHARGE NOTE PROVIDER - NSDCQMAMI_CARD_ALL_CORE
CC:  Pro Cruz is here today for CPE and labs.      Medications: medications verified and updated  Refills needed today?  YES  denies Latex allergy or sensitivity  Tobacco history: verified  Health Maintenance Due   Topic Date Due   • DTaP/Tdap/Td Vaccine (1 - Tdap) 04/23/1969   • Depression Screening  04/23/1970   • Shingles Vaccine (1 of 2) 04/23/2008   • Hepatitis C Screening  04/23/2009     Patient is due for the topics as listed above and wishes to discuss with the provider.  Patient would like communication of messages and results via:        Cell Phone:   Telephone Information:   Mobile [phone]     Okay to leave a message containing results? Yes     Has [employer] form been filled out?  Yes     Has the patient completed Advanced Directives? Yes      No
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
## 2022-04-12 NOTE — DISCHARGE NOTE PROVIDER - CARE PROVIDERS DIRECT ADDRESSES
,DirectAddress_Unknown ,DirectAddress_Unknown,[email or web address],DirectAddress_Unknown
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
## 2022-04-13 VITALS
HEART RATE: 80 BPM | SYSTOLIC BLOOD PRESSURE: 109 MMHG | OXYGEN SATURATION: 100 % | DIASTOLIC BLOOD PRESSURE: 66 MMHG | TEMPERATURE: 98 F | RESPIRATION RATE: 17 BRPM

## 2022-04-13 PROCEDURE — 97116 GAIT TRAINING THERAPY: CPT

## 2022-04-13 PROCEDURE — 85027 COMPLETE CBC AUTOMATED: CPT

## 2022-04-13 PROCEDURE — 99153 MOD SED SAME PHYS/QHP EA: CPT

## 2022-04-13 PROCEDURE — 93567 NJX CAR CTH SPRVLV AORTGRPHY: CPT

## 2022-04-13 PROCEDURE — 82550 ASSAY OF CK (CPK): CPT

## 2022-04-13 PROCEDURE — 83880 ASSAY OF NATRIURETIC PEPTIDE: CPT

## 2022-04-13 PROCEDURE — 82553 CREATINE MB FRACTION: CPT

## 2022-04-13 PROCEDURE — 93308 TTE F-UP OR LMTD: CPT

## 2022-04-13 PROCEDURE — C1769: CPT

## 2022-04-13 PROCEDURE — 72125 CT NECK SPINE W/O DYE: CPT | Mod: MA

## 2022-04-13 PROCEDURE — 93459 L HRT ART/GRFT ANGIO: CPT

## 2022-04-13 PROCEDURE — 84484 ASSAY OF TROPONIN QUANT: CPT

## 2022-04-13 PROCEDURE — 82330 ASSAY OF CALCIUM: CPT

## 2022-04-13 PROCEDURE — 71260 CT THORAX DX C+: CPT | Mod: MA

## 2022-04-13 PROCEDURE — 99285 EMERGENCY DEPT VISIT HI MDM: CPT | Mod: 25

## 2022-04-13 PROCEDURE — 85730 THROMBOPLASTIN TIME PARTIAL: CPT

## 2022-04-13 PROCEDURE — 83605 ASSAY OF LACTIC ACID: CPT

## 2022-04-13 PROCEDURE — 84132 ASSAY OF SERUM POTASSIUM: CPT

## 2022-04-13 PROCEDURE — 84295 ASSAY OF SERUM SODIUM: CPT

## 2022-04-13 PROCEDURE — 76705 ECHO EXAM OF ABDOMEN: CPT

## 2022-04-13 PROCEDURE — 83735 ASSAY OF MAGNESIUM: CPT

## 2022-04-13 PROCEDURE — 85014 HEMATOCRIT: CPT

## 2022-04-13 PROCEDURE — 80053 COMPREHEN METABOLIC PANEL: CPT

## 2022-04-13 PROCEDURE — 71275 CT ANGIOGRAPHY CHEST: CPT

## 2022-04-13 PROCEDURE — C1894: CPT

## 2022-04-13 PROCEDURE — 36415 COLL VENOUS BLD VENIPUNCTURE: CPT

## 2022-04-13 PROCEDURE — 70450 CT HEAD/BRAIN W/O DYE: CPT | Mod: MG

## 2022-04-13 PROCEDURE — 80048 BASIC METABOLIC PNL TOTAL CA: CPT

## 2022-04-13 PROCEDURE — 85610 PROTHROMBIN TIME: CPT

## 2022-04-13 PROCEDURE — 97161 PT EVAL LOW COMPLEX 20 MIN: CPT

## 2022-04-13 PROCEDURE — 96374 THER/PROPH/DIAG INJ IV PUSH: CPT

## 2022-04-13 PROCEDURE — 86803 HEPATITIS C AB TEST: CPT

## 2022-04-13 PROCEDURE — 85018 HEMOGLOBIN: CPT

## 2022-04-13 PROCEDURE — C1887: CPT

## 2022-04-13 PROCEDURE — 93005 ELECTROCARDIOGRAM TRACING: CPT

## 2022-04-13 PROCEDURE — 82435 ASSAY OF BLOOD CHLORIDE: CPT

## 2022-04-13 PROCEDURE — 97530 THERAPEUTIC ACTIVITIES: CPT

## 2022-04-13 PROCEDURE — 99152 MOD SED SAME PHYS/QHP 5/>YRS: CPT

## 2022-04-13 PROCEDURE — 80076 HEPATIC FUNCTION PANEL: CPT

## 2022-04-13 PROCEDURE — 96361 HYDRATE IV INFUSION ADD-ON: CPT

## 2022-04-13 PROCEDURE — 87449 NOS EACH ORGANISM AG IA: CPT

## 2022-04-13 PROCEDURE — 82803 BLOOD GASES ANY COMBINATION: CPT

## 2022-04-13 PROCEDURE — G1004: CPT

## 2022-04-13 PROCEDURE — 82248 BILIRUBIN DIRECT: CPT

## 2022-04-13 PROCEDURE — 74177 CT ABD & PELVIS W/CONTRAST: CPT | Mod: MA

## 2022-04-13 PROCEDURE — 84100 ASSAY OF PHOSPHORUS: CPT

## 2022-04-13 PROCEDURE — 93306 TTE W/DOPPLER COMPLETE: CPT

## 2022-04-13 PROCEDURE — U0003: CPT

## 2022-04-13 PROCEDURE — 82272 OCCULT BLD FECES 1-3 TESTS: CPT

## 2022-04-13 PROCEDURE — 82947 ASSAY GLUCOSE BLOOD QUANT: CPT

## 2022-04-13 PROCEDURE — 93970 EXTREMITY STUDY: CPT

## 2022-04-13 RX ORDER — CYCLOBENZAPRINE HYDROCHLORIDE 10 MG/1
1 TABLET, FILM COATED ORAL
Qty: 0 | Refills: 0 | DISCHARGE

## 2022-04-13 RX ORDER — DULOXETINE HYDROCHLORIDE 30 MG/1
1 CAPSULE, DELAYED RELEASE ORAL
Qty: 30 | Refills: 0
Start: 2022-04-13 | End: 2022-05-12

## 2022-04-13 RX ORDER — FUROSEMIDE 40 MG
1 TABLET ORAL
Qty: 30 | Refills: 0
Start: 2022-04-13 | End: 2022-05-12

## 2022-04-13 RX ORDER — CLOPIDOGREL BISULFATE 75 MG/1
1 TABLET, FILM COATED ORAL
Qty: 30 | Refills: 0
Start: 2022-04-13 | End: 2022-05-12

## 2022-04-13 RX ORDER — METOPROLOL TARTRATE 50 MG
1 TABLET ORAL
Qty: 0 | Refills: 0 | DISCHARGE

## 2022-04-13 RX ORDER — TICAGRELOR 90 MG/1
1 TABLET ORAL
Qty: 0 | Refills: 0 | DISCHARGE

## 2022-04-13 RX ORDER — METOPROLOL TARTRATE 50 MG
1 TABLET ORAL
Qty: 60 | Refills: 0
Start: 2022-04-13 | End: 2022-05-12

## 2022-04-13 RX ORDER — TIZANIDINE 4 MG/1
1 TABLET ORAL
Qty: 0 | Refills: 0 | DISCHARGE

## 2022-04-13 RX ORDER — SACUBITRIL AND VALSARTAN 24; 26 MG/1; MG/1
1 TABLET, FILM COATED ORAL
Qty: 60 | Refills: 0
Start: 2022-04-13 | End: 2022-05-12

## 2022-04-13 RX ORDER — NITROGLYCERIN 6.5 MG
1 CAPSULE, EXTENDED RELEASE ORAL
Qty: 864 | Refills: 0
Start: 2022-04-13 | End: 2022-04-15

## 2022-04-13 RX ORDER — AMLODIPINE BESYLATE 2.5 MG/1
1 TABLET ORAL
Qty: 0 | Refills: 0 | DISCHARGE

## 2022-04-13 RX ORDER — DULOXETINE HYDROCHLORIDE 30 MG/1
1 CAPSULE, DELAYED RELEASE ORAL
Qty: 0 | Refills: 0 | DISCHARGE

## 2022-04-13 RX ORDER — NITROGLYCERIN 6.5 MG
1 CAPSULE, EXTENDED RELEASE ORAL
Qty: 15 | Refills: 0
Start: 2022-04-13 | End: 2022-04-15

## 2022-04-13 RX ORDER — CEFUROXIME AXETIL 250 MG
1 TABLET ORAL
Qty: 4 | Refills: 0
Start: 2022-04-13 | End: 2022-04-14

## 2022-04-13 RX ADMIN — DULOXETINE HYDROCHLORIDE 20 MILLIGRAM(S): 30 CAPSULE, DELAYED RELEASE ORAL at 11:18

## 2022-04-13 RX ADMIN — SACUBITRIL AND VALSARTAN 1 TABLET(S): 24; 26 TABLET, FILM COATED ORAL at 16:58

## 2022-04-13 RX ADMIN — GABAPENTIN 100 MILLIGRAM(S): 400 CAPSULE ORAL at 13:08

## 2022-04-13 RX ADMIN — Medication 50 MILLIGRAM(S): at 05:21

## 2022-04-13 RX ADMIN — Medication 500 MILLIGRAM(S): at 05:21

## 2022-04-13 RX ADMIN — SACUBITRIL AND VALSARTAN 1 TABLET(S): 24; 26 TABLET, FILM COATED ORAL at 05:21

## 2022-04-13 RX ADMIN — Medication 500 MILLIGRAM(S): at 16:58

## 2022-04-13 RX ADMIN — Medication 50 MILLIGRAM(S): at 16:58

## 2022-04-13 RX ADMIN — Medication 20 MILLIGRAM(S): at 05:21

## 2022-04-13 RX ADMIN — GABAPENTIN 100 MILLIGRAM(S): 400 CAPSULE ORAL at 05:21

## 2022-04-13 RX ADMIN — Medication 81 MILLIGRAM(S): at 11:18

## 2022-04-13 RX ADMIN — HEPARIN SODIUM 5000 UNIT(S): 5000 INJECTION INTRAVENOUS; SUBCUTANEOUS at 05:22

## 2022-04-13 RX ADMIN — CLOPIDOGREL BISULFATE 75 MILLIGRAM(S): 75 TABLET, FILM COATED ORAL at 11:19
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
## 2022-04-13 NOTE — PHARMACOTHERAPY INTERVENTION NOTE - COMMENTS
Counseled patient on the following new discharge medications names (brand/generic), indication, and possible side effects:  Plavix 75mg daily (was on Brilinta previously)  Toprol XL 50mg daily (was on metoprolol tartrate previously)  Lasix 20mg daily  Entresto 24/26mg twice daily    Entresto RX sent to [facility] pharmacy - Copay is $0. Communicated with patient and let him know meds are ready for  at [facility] downstairs.     Counseled patient on the above medications names (brand/generic), indication, and possible side effects and provided patient with a medication card. Counseled patient to observe and obtain daily weights and to notify doctor if >2-3 lbs/day or >5lbs/week weight gain, increased short of breath or using more pillows at nighttime. Answered all of the patient’s questions to the best of my ability. Patient exhibited understanding of heart failure medication regimen and management. Patient understood importance of compliance and to follow up with cardiologist outpatient after discharge.    Prema Purcell, PharmD, [facility]  Clinical Pharmacy Specialist  [phone] or Teams

## 2022-04-13 NOTE — DISCHARGE NOTE NURSING/CASE MANAGEMENT/SOCIAL WORK - NSDCPEFALRISK_GEN_ALL_CORE
For information on Fall & Injury Prevention, visit: [email or web address] OR  [email or web address] OR  https://www.cdc.gov/steadi/patient.html

## 2022-04-13 NOTE — DISCHARGE NOTE NURSING/CASE MANAGEMENT/SOCIAL WORK - NSDCPEWEB_GEN_ALL_CORE
[facility] for Tobacco Control website --- [email or web address] website --- [email or web address]

## 2022-04-13 NOTE — PROGRESS NOTE ADULT - PROVIDER SPECIALTY LIST ADULT
Cardiology
Cardiology
Gastroenterology
Infectious Disease
Internal Medicine
Cardiology
Electrophysiology
Electrophysiology
Gastroenterology
Gastroenterology
Infectious Disease
Internal Medicine
Internal Medicine
Electrophysiology
Internal Medicine
Gastroenterology
Internal Medicine
Internal Medicine

## 2022-04-13 NOTE — DISCHARGE NOTE NURSING/CASE MANAGEMENT/SOCIAL WORK - PATIENT PORTAL LINK FT
You can access the FollowMyHealth Patient Portal offered by [facility] by registering at the following website: [email or web address]. By joining "[employer]"’s FollowMyHealth portal, you will also be able to view your health information using other applications (apps) compatible with our system.
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
## 2022-04-13 NOTE — DISCHARGE NOTE NURSING/CASE MANAGEMENT/SOCIAL WORK - NSDCPEEMAIL_GEN_ALL_CORE
[facility] for Tobacco Control email [email or web address]

## 2022-04-13 NOTE — CHART NOTE - NSCHARTNOTEFT_GEN_A_CORE
Pt came to the ED to retrieve his scooter and keys today. He reports that he was discharged earlier today from [facility]. Emotional support provided. Scooter and bautista given to Pt. Pt verbalized gratitude. No further swk intervention needed at this time. Pt came to the ED to retrieve his scooter and keys today. He reports that he was discharged earlier today from [facility]. Emotional support provided. Scooter and bautista given to Pt. jeffrey observed Pt riding scooter out of the hospital. Pt verbalized gratitude. No further swk intervention needed at this time. Pt came to the ED to retrieve his scooter and keys today. He reports that he was discharged earlier today from [facility]. Emotional support provided. Scooter and bautista given to Pt.  Pt verbalized gratitude. Kiana observed Pt riding scooter out of the hospital. No further swk intervention needed at this time.

## 2023-01-01 NOTE — ED ADULT NURSE NOTE - ALCOHOL PRE SCREEN (AUDIT - C)
Acceptable eye movement/Lids with acceptable appearance and movement/Conjunctiva clear/Iris acceptable shape and color/Cornea clear/Pupils equally round and react to light/Pupil red reflexes present and equal
Statement Selected

## 2023-05-01 ENCOUNTER — EMERGENCY (EMERGENCY)
Facility: HOSPITAL | Age: 68
LOS: 1 days | Discharge: HOME | End: 2023-05-01
Attending: EMERGENCY MEDICINE
Payer: MEDICARE

## 2023-05-01 VITALS
TEMPERATURE: 98 F | SYSTOLIC BLOOD PRESSURE: 163 MMHG | HEART RATE: 97 BPM | RESPIRATION RATE: 16 BRPM | DIASTOLIC BLOOD PRESSURE: 91 MMHG | OXYGEN SATURATION: 95 %

## 2023-05-01 PROBLEM — R29.898 OTHER SYMPTOMS AND SIGNS INVOLVING THE MUSCULOSKELETAL SYSTEM: Chronic | Status: ACTIVE | Noted: 2022-04-06

## 2023-05-01 PROCEDURE — 99285 EMERGENCY DEPT VISIT HI MDM: CPT

## 2023-05-01 PROCEDURE — 93010 ELECTROCARDIOGRAM REPORT: CPT

## 2023-05-01 RX ORDER — MIDAZOLAM HYDROCHLORIDE 1 MG/ML
4 INJECTION, SOLUTION INTRAMUSCULAR; INTRAVENOUS ONCE
Refills: 0 | Status: DISCONTINUED | OUTPATIENT
Start: 2023-05-01 | End: 2023-05-01

## 2023-05-01 RX ORDER — HALOPERIDOL DECANOATE 100 MG/ML
5 INJECTION INTRAMUSCULAR ONCE
Refills: 0 | Status: COMPLETED | OUTPATIENT
Start: 2023-05-01 | End: 2023-05-01

## 2023-05-01 RX ADMIN — HALOPERIDOL DECANOATE 5 MILLIGRAM(S): 100 INJECTION INTRAMUSCULAR at 22:11

## 2023-05-01 RX ADMIN — MIDAZOLAM HYDROCHLORIDE 4 MILLIGRAM(S): 1 INJECTION, SOLUTION INTRAMUSCULAR; INTRAVENOUS at 22:07
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
## 2023-05-01 NOTE — ED PROVIDER NOTE - NSFOLLOWUPCLINICS_GEN_ALL_ED_FT
[facility]  [facility]  [address] [place] [address]  Phone: [phone]  Fax: [phone]    [facility]  Detox  [address] [place] [address]  Phone: [phone]  Fax: [phone]

## 2023-05-01 NOTE — ED ADULT NURSE NOTE - PATIENT IS UNABLE TO BE SCREENED DUE TO:
Chief Complaint: none  Pt intubated and unresponsive. No further facial twitching per nursing. Allergies  Review of patient's allergies indicates no known allergies.      Medications  Current Facility-Administered Medications   Medication Dose Route Frequency    lisinopril (PRINIVIL, ZESTRIL) tablet 2.5 mg  2.5 mg Oral DAILY    0.9% sodium chloride infusion  50 mL/hr IntraVENous CONTINUOUS    acetaminophen (OFIRMEV) infusion 1,000 mg  1,000 mg IntraVENous Q6H PRN    albuterol-ipratropium (DUO-NEB) 2.5 MG-0.5 MG/3 ML  3 mL Nebulization QID RT    enoxaparin (LOVENOX) injection 40 mg  40 mg SubCUTAneous Q24H    piperacillin-tazobactam (ZOSYN) 3.375 g in 0.9% sodium chloride (MBP/ADV) 100 mL MBP  3.375 g IntraVENous Q8H    levETIRAcetam (KEPPRA) 500 mg in 0.9% sodium chloride IVPB  500 mg IntraVENous Q12H    LORazepam (ATIVAN) injection 2-5 mg  2-5 mg IntraVENous Q1H PRN    pantoprazole (PROTONIX) 40 mg in sodium chloride 0.9 % 10 mL injection  40 mg IntraVENous Q12H    sodium chloride (NS) flush 5-10 mL  5-10 mL IntraVENous PRN    morphine injection 4 mg  4 mg IntraVENous Q4H PRN    ondansetron (ZOFRAN) injection 4 mg  4 mg IntraVENous Q4H PRN    ticagrelor (BRILINTA) tablet 90 mg  90 mg Oral Q12H    aspirin delayed-release tablet 81 mg  81 mg Oral DAILY    fentaNYL (PF) 900 mcg/30 ml infusion soln   mcg/hr IntraVENous TITRATE    sodium chloride (NS) flush 5-10 mL  5-10 mL IntraVENous Q8H    mupirocin (BACTROBAN) 2 % ointment   Both Nostrils BID    chlorhexidine (PERIDEX) 0.12 % mouthwash 15 mL  15 mL Oral Q12H    propofol (DIPRIVAN) infusion  5-50 mcg/kg/min IntraVENous TITRATE        Medical History  Hyperlipidemia  hypertension    Exam:    Visit Vitals    /72    Pulse 69    Temp 97.8 °F (36.6 °C)    Resp 20    Ht 5' 10\" (1.778 m)    Wt 124.9 kg (275 lb 5.7 oz)    SpO2 99%    BMI 39.51 kg/m2     GEN: Intubated and sedated  HEENT: NC AT Anicteric sclera  CHEST: rhoncorous (Lt) No wheezes  HEART: S1 and S2 regular rate and rhythm  ABD: Soft nontender non distended  EXT: No edema     Neuro   Sedated intubated  Spontaneous eye movement and eyelid movement  No clear signs of purposeful movement    Cranial Nerves : intact pupils, corneal reflex, no dolls eyes, No response to sound and no facial grimace. Withdraws extremities x4 to nailbed pressure  Reflexes: 3+  both lower extremities. Sustained achilles clonus again on exam.     Imaging    CT Results (most recent):    Results from Hospital Encounter encounter on 05/09/17   CT SPINE CERV WO CONT   Narrative INDICATION:   Recent trauma, spine with neck injury    COMPARISON: None. TECHNIQUE:   Noncontrast axial CT imaging of the cervical spine was performed. Coronal and sagittal reconstructions were obtained. CT dose reduction was achieved through the use of a standardized protocol  tailored for this examination and automatic exposure control for dose  modulation. FINDINGS:    There is no evidence of acute osseous abnormality. There is no acute alignment  abnormality. Vertebral body heights are maintained. There is no appreciable  prevertebral soft tissue swelling or epidural hematoma. There is multilevel  degenerative spondylosis. There is multilevel osseous neuroforaminal stenosis. There is central canal stenosis in the lower cervical spine. Evaluation of the  paraspinal soft tissues demonstrates no significant pathology. The visualized  lung apices are clear. The patient is intubated. There is an NG tube. Impression IMPRESSION:    1. No acute osseous abnormality. 2. Multilevel degenerative spondylosis. EEG: Slowing but could be secondary to sedation. No seizures  .   Lab Review    Lab Results   Component Value Date/Time    WBC 23.3 05/13/2017 02:32 AM    HCT 35.4 05/13/2017 02:32 AM    HGB 12.0 05/13/2017 02:32 AM    PLATELET 963 00/41/9592 02:32 AM       Lab Results   Component Value Date/Time    Sodium 140 05/13/2017 02:32 AM    Potassium 3.2 05/13/2017 02:32 AM    Chloride 105 05/13/2017 02:32 AM    CO2 24 05/13/2017 02:32 AM    Glucose 106 05/13/2017 02:32 AM    BUN 14 05/13/2017 02:32 AM    Creatinine 0.93 05/13/2017 02:32 AM    Calcium 8.4 05/13/2017 02:32 AM       Lab Results   Component Value Date/Time    Hemoglobin A1c 5.5 05/10/2017 04:30 AM        Lab Results   Component Value Date/Time    Cholesterol, total 168 05/10/2017 04:30 AM    HDL Cholesterol 39 05/10/2017 04:30 AM    LDL, calculated 101 05/10/2017 04:30 AM    VLDL, calculated 28 05/10/2017 04:30 AM    Triglyceride 140 05/10/2017 04:30 AM    CHOL/HDL Ratio 4.3 05/10/2017 04:30 AM            ASSESSMENT AND PLAN   This is a 58-year-old gentleman who presented status post out of hospital arrest with PEA. He did have a 6 minute period of no resuscitation prior to EMS arriving. He also had some evidence for myoclonus on admission. He has required propofol since that time but this is been weaned as of today. Patient continues to be minimally responsive. EEG has not shown any further seizure activity now the patient is on Keppra. Family is at the bedside and we discussed patient's prognosis is guarded. 1. Seizure  Continue keppra  EEG to be repeated on Monday  May need repeat CT of the head at that time as well versus MRI to evaluate for anoxia    2. Cardiac arrest  S/p stenting     3. Hyperlipidemia     Prognosis is very guarded at this point. Discussed with patient's . Will continue to follow. Over 50 minutes was spent in the care of this critically ill patient reviewing records, discussing with Dr. Karina Mota and nursing, obtaining history from family, examining patient and discussing treatment plans with family. Acuity of illness

## 2023-05-01 NOTE — ED PROVIDER NOTE - OBJECTIVE STATEMENT
68-year-old male was brought in intoxicated was found on the street on the floor next to his walker.  Unknown if any head trauma.  In the ED patient was agitated belligerent and spitting at the staff.  There was some vomit on his pants.  Unknown if LOC.  Patient was sedated with 4 mg of Versed and 5 mg of Haldol IM.

## 2023-05-01 NOTE — ED PROVIDER NOTE - PATIENT PORTAL LINK FT
You can access the FollowMyHealth Patient Portal offered by [facility] by registering at the following website: [email or web address]. By joining [employer]’s FollowMyHealth portal, you will also be able to view your health information using other applications (apps) compatible with our system.

## 2023-05-01 NOTE — ED PROVIDER NOTE - CLINICAL SUMMARY MEDICAL DECISION MAKING FREE TEXT BOX
68-year-old male was brought in intoxicated was found on the street on the floor next to his walker.  Unknown if any head trauma.  In the ED patient was agitated belligerent and spitting at the staff.  There was some vomit on his pants.  Unknown if LOC.  Patient was sedated with 4 mg of Versed and 5 mg of Haldol IM.   Ct head   sedation   pending sobriety and reeval

## 2023-05-01 NOTE — ED PROVIDER NOTE - PROGRESS NOTE DETAILS
patient signeodut to me by Dr. medina, awiating CThead and sobriety.  CT head shows No acute findings on noncontrast CT of the brain.  On reeval at 6am patient awake and alert, ambulates w his walker w/o difficulty. patient stable for discharge.  Clarissa LOZANO

## 2023-05-01 NOTE — ED PROVIDER NOTE - NSICDXPASTMEDICALHX_GEN_ALL_CORE_FT
PAST MEDICAL HISTORY:  Heart attack     Leg weakness     S/P CABG (coronary artery bypass graft)

## 2023-05-02 VITALS
TEMPERATURE: 97 F | OXYGEN SATURATION: 96 % | SYSTOLIC BLOOD PRESSURE: 119 MMHG | HEART RATE: 75 BPM | RESPIRATION RATE: 18 BRPM | DIASTOLIC BLOOD PRESSURE: 68 MMHG

## 2023-05-02 PROCEDURE — 96372 THER/PROPH/DIAG INJ SC/IM: CPT

## 2023-05-02 PROCEDURE — 93005 ELECTROCARDIOGRAM TRACING: CPT

## 2023-05-02 PROCEDURE — G1004: CPT

## 2023-05-02 PROCEDURE — 70450 CT HEAD/BRAIN W/O DYE: CPT | Mod: 26,MG

## 2023-05-02 PROCEDURE — 82962 GLUCOSE BLOOD TEST: CPT

## 2023-05-02 PROCEDURE — 99285 EMERGENCY DEPT VISIT HI MDM: CPT | Mod: 25

## 2023-05-02 PROCEDURE — 70450 CT HEAD/BRAIN W/O DYE: CPT | Mod: MG

## 2023-05-02 RX ORDER — MIDAZOLAM HYDROCHLORIDE 1 MG/ML
5 INJECTION, SOLUTION INTRAMUSCULAR; INTRAVENOUS ONCE
Refills: 0 | Status: DISCONTINUED | OUTPATIENT
Start: 2023-05-02 | End: 2023-05-02

## 2023-05-02 RX ADMIN — MIDAZOLAM HYDROCHLORIDE 5 MILLIGRAM(S): 1 INJECTION, SOLUTION INTRAMUSCULAR; INTRAVENOUS at 00:14

## 2023-05-02 NOTE — ED ADULT NURSE REASSESSMENT NOTE - NS ED NURSE REASSESS COMMENT FT1
Patient awake and A&Ox3. Patient calm, able to self ambulate and make their needs known. Patient discharged.
Patient received 5mg versed taken to CAT Scan. Patient resting comfortably in bed. 97% on room air. No signs of distress noted
Received patient from EMS intoxicated. 4mg versed and 5mg haldol given. Patient resting comfortably, placed on cardiac monitor. Patient 99% on room air. No signs of distress noted.

## 2023-05-15 ENCOUNTER — EMERGENCY (EMERGENCY)
Facility: HOSPITAL | Age: 68
LOS: 1 days | Discharge: HOME | End: 2023-05-15
Attending: EMERGENCY MEDICINE
Payer: MEDICARE

## 2023-05-15 VITALS
DIASTOLIC BLOOD PRESSURE: 78 MMHG | HEIGHT: 68 IN | RESPIRATION RATE: 18 BRPM | HEART RATE: 74 BPM | TEMPERATURE: 98 F | OXYGEN SATURATION: 99 % | SYSTOLIC BLOOD PRESSURE: 131 MMHG | WEIGHT: 131.62 LBS

## 2023-05-15 PROCEDURE — 99284 EMERGENCY DEPT VISIT MOD MDM: CPT

## 2023-05-15 PROCEDURE — 73630 X-RAY EXAM OF FOOT: CPT | Mod: 26,LT

## 2023-05-15 PROCEDURE — 99283 EMERGENCY DEPT VISIT LOW MDM: CPT | Mod: 25

## 2023-05-15 PROCEDURE — 73630 X-RAY EXAM OF FOOT: CPT

## 2023-05-15 RX ORDER — ACETAMINOPHEN 500 MG
650 TABLET ORAL ONCE
Refills: 0 | Status: COMPLETED | OUTPATIENT
Start: 2023-05-15 | End: 2023-05-15

## 2023-05-15 RX ADMIN — Medication 650 MILLIGRAM(S): at 12:10

## 2023-05-15 NOTE — ED PROVIDER NOTE - NSFOLLOWUPINSTRUCTIONS_ED_ALL_ED_FT
Follow up with the primary care doctor in 2-3 days.  If you experience any new or worsening symptoms or if you are concerned you can always come back to the emergency for a re-evaluation.  If there were any prescriptions given to you during the visit today take them as prescribed. If you have any questions you can ask the pharmacist.     Come back to the hospital if:  Leg is hot, red  Swelling is worse or going up your leg  Fever, chills, night sweats  Open wounds

## 2023-05-15 NOTE — ED ADULT NURSE NOTE - NSFALLUNIVINTERV_ED_ALL_ED
Bed/Stretcher in lowest position, wheels locked, appropriate side rails in place/Call bell, personal items and telephone in reach/Instruct patient to call for assistance before getting out of bed/chair/stretcher/Non-slip footwear applied when patient is off stretcher/[place] to call system/Physically safe environment - no spills, clutter or unnecessary equipment/Purposeful proactive rounding/Room/bathroom lighting operational, light cord in reach

## 2023-05-15 NOTE — ED PROVIDER NOTE - PATIENT PORTAL LINK FT
You can access the FollowMyHealth Patient Portal offered by [facility] by registering at the following website: [email or web address]. By joining [employer]s FollowMyHealth portal, you will also be able to view your health information using other applications (apps) compatible with our system.

## 2023-05-15 NOTE — ED PROVIDER NOTE - ATTENDING APP SHARED VISIT CONTRIBUTION OF CARE
68-year-old with history of HTN, age, HLD, DM, CAD status post CABG on aspirin and Plavix, presents with swelling to left dorsal foot. Patient states he thinks that he dropped a count on the foot on Friday. He feels that the swelling may have improved. Denies fever, chills, fall other trauma, and all other symptoms. On exam, afebrile, hemodynamically stable, saturating well on room air, NAD, well appearing, sitting comfortably in chair, no WOB, speaking full sentences, head NCAT, EOMI grossly, MMM, RRR, breathing comfortably on room air, AAO, CN's 3-12 grossly intact, WILLIAM spontaneously, full ankle plantar/dorsiflexion, full toe wiggling noted dorsal midfoot swelling with no induration/fluctuance or undue warmth/sensation, no leg cyanosis or edema or TTP, skin warm, well perfused. Exam very low suspicion for cellulitis or abscess to warrant antibiotics. No evidence of underlying fracture on x-ray. Ambulating independently without issues. Neurovascularly intact extremity. Patient is well appearing, NAD, afebrile, hemodynamically stable. Any available tests and studies were discussed with patient. Discharged with instructions in further symptomatic care, return precautions, and need for f/u.

## 2023-05-15 NOTE — ED PROVIDER NOTE - PHYSICAL EXAMINATION
Calf compartments soft and nontender. Left ankle and left knee full range of motion without swelling or tenderness.  Left foot with moderate nonpitting edema and generalized midfoot tenderness.  No open wounds.  DP/PT pulses intact 2+.  Cap refill less than 2 seconds.

## 2023-05-15 NOTE — ED PROVIDER NOTE - NSICDXPASTMEDICALHX_GEN_ALL_CORE_FT
PAST MEDICAL HISTORY:  Diabetes     Heart attack     HLD (hyperlipidemia)     HTN (hypertension)     Leg weakness     S/P CABG (coronary artery bypass graft)

## 2023-05-15 NOTE — ED ADULT NURSE NOTE - OBJECTIVE STATEMENT
pt is a 69 y/o male with c/o left foot pain 3-4 days  denies  any trauma denies any fever and chills.

## 2023-05-15 NOTE — ED PROVIDER NOTE - OBJECTIVE STATEMENT
68-year-old male, history of hypertension, HLD, diabetes, CABG, cardiac stents (on aspirin and Plavix), presents for evaluation of left foot injury 2 days ago.  Reports that dropped a can on his foot and since then developed swelling and pain.  Still able to walk with pain.  Associated with swelling that is localized to the foot.  Denies any fever, chills, redness or increased warmth, numbness, tingling or any other complaints.

## 2024-04-12 NOTE — ED ADULT TRIAGE NOTE - MEANS OF ARRIVAL
Attempted to contact patient after receiving voice message that patient was interested in scheduling appointment to start program.  Left voice message with name and contact information.  
stretcher

## 2025-02-18 NOTE — ED ADULT TRIAGE NOTE - DOMESTIC TRAVEL HIGH RISK QUESTION
Called lacie back and advised he can change bowel prep to whatever is covered by insurance. 
LUIS M FROM  PHARMACY CALLED, NEEDING TO SPEAK TO CLINICAL TEAM MEMBER  (DESHAWN) IN REGARDS TO A PA FOR A BOWEL PREP. TRANSFERRED CALL TO MA'S LINE, TOLD LUIS M CLINICAL TEAM IS SEEING PATIENT'S AND INCASE THEY DO NOT ANSWER, LEAVE VOICE MESSAGE. CALL BACK NUMBER [phone] OPTION 2.   
No